# Patient Record
Sex: FEMALE | Race: WHITE | NOT HISPANIC OR LATINO | Employment: OTHER | URBAN - METROPOLITAN AREA
[De-identification: names, ages, dates, MRNs, and addresses within clinical notes are randomized per-mention and may not be internally consistent; named-entity substitution may affect disease eponyms.]

---

## 2021-05-06 ENCOUNTER — TRANSFERRED RECORDS (OUTPATIENT)
Dept: HEALTH INFORMATION MANAGEMENT | Facility: CLINIC | Age: 71
End: 2021-05-06

## 2021-05-12 ENCOUNTER — TRANSFERRED RECORDS (OUTPATIENT)
Dept: HEALTH INFORMATION MANAGEMENT | Facility: CLINIC | Age: 71
End: 2021-05-12

## 2021-05-17 ENCOUNTER — TRANSFERRED RECORDS (OUTPATIENT)
Dept: HEALTH INFORMATION MANAGEMENT | Facility: CLINIC | Age: 71
End: 2021-05-17

## 2021-06-25 ENCOUNTER — TRANSFERRED RECORDS (OUTPATIENT)
Dept: HEALTH INFORMATION MANAGEMENT | Facility: CLINIC | Age: 71
End: 2021-06-25

## 2021-09-10 ENCOUNTER — PATIENT OUTREACH (OUTPATIENT)
Dept: RADIATION ONCOLOGY | Facility: HOSPITAL | Age: 71
End: 2021-09-10

## 2021-09-10 DIAGNOSIS — C20 MALIGNANT NEOPLASM OF RECTUM (H): Primary | ICD-10-CM

## 2021-09-10 PROBLEM — K86.89 PANCREATIC INSUFFICIENCY: Status: ACTIVE | Noted: 2021-09-10

## 2021-09-10 PROBLEM — C34.2 PRIMARY MALIGNANT NEOPLASM OF RIGHT MIDDLE LOBE OF LUNG (H): Status: ACTIVE | Noted: 2021-09-10

## 2021-09-10 PROBLEM — J43.9 EMPHYSEMA LUNG (H): Status: ACTIVE | Noted: 2021-09-10

## 2021-09-10 PROBLEM — I10 HTN (HYPERTENSION): Status: ACTIVE | Noted: 2021-09-10

## 2021-09-10 RX ORDER — LOPERAMIDE HCL 2 MG
2 CAPSULE ORAL 4 TIMES DAILY PRN
COMMUNITY

## 2021-09-10 RX ORDER — AMLODIPINE BESYLATE 5 MG/1
5 TABLET ORAL DAILY
COMMUNITY
End: 2021-09-14

## 2021-09-10 RX ORDER — ACETAMINOPHEN 325 MG/1
325-650 TABLET ORAL EVERY 6 HOURS PRN
COMMUNITY

## 2021-09-10 NOTE — PROGRESS NOTES
PRE VISIT MEDICAL ONCOLOGY     REASON FOR APPOINTMENT    Type of Cancer -   T3N0       SYMPTOMS   Symptom onset - Was having multiple loose stools a day, at times bloody. 45 lb wt loss    Medical Provider Seen Initially - PCP    PROVIDERS  Referring MD - Dr. Brothers  PCP - Beverley Laurent  Surgeon - Dr. Haines  Other provider(s) seen for consultation or treatment -      TREATMENT TO-DATE FOR THIS CANCER  Biopsy - 5-17-21 CT guided biopsy  Invasive moderately differentiated adenocarcinoma of rectum  Surgery -       Chemotherapy - Neoadjuvant FOLFOX    Oncologist - Dr. Brothers  Hormonal therapy used - NA  Other treatments for this Cancer (including over-the-counter) - None    PRIOR HISTORY OF CANCER  None    RECENT IMAGING STUDIES    (list setting/date)    PET - 6-25-21  CT - (CAP)-Chest- 5-10-21 Abdomen/pelvis 5-5-21  Bone Scan (Dexa) -   MRI - Brain 6-19-21  PFTs         CENTRAL LINE/PORT     Yes - PIC / PORT - (End of June        HEALTH SCREENING (list most recent dates)  Mammogram -  Colonoscopy -  Dental Exam - not assessed  T-Dap - 12-7-9  Pneumonia -   Flu Shot -   Shingles - 11-8-16  COVID 3-11-21, 4-1-21    FAMILY CANCER HISTORY (list relative/type of cancer)      No history            TOBACCO USE HISTORY     Former smoker, 50 pack years, quit in January of 2021  OTHER PERTINENT CANCER INFORMATION NOT IDENTIFIED ELSEWHERE  Right middle lobe mass found on CT chest, biopsied poorly differentiated squamous cell carcinoma PD-L1 10 %  History of whipple procedure 5-31-18  Emphysema  Weak and can only walk 30 feet. Requesting a w/c  Reports appetite is coming back  Next chemotherapy is due 9-20-21  Would like appointment around 10:30  Right handed  No history of radiation therapy  Was a

## 2021-09-14 ENCOUNTER — ONCOLOGY VISIT (OUTPATIENT)
Dept: RADIATION ONCOLOGY | Facility: HOSPITAL | Age: 71
End: 2021-09-14
Attending: RADIOLOGY
Payer: MEDICARE

## 2021-09-14 VITALS — WEIGHT: 107.5 LBS | HEIGHT: 66 IN | BODY MASS INDEX: 17.28 KG/M2

## 2021-09-14 DIAGNOSIS — C20 MALIGNANT NEOPLASM OF RECTUM (H): Primary | ICD-10-CM

## 2021-09-14 PROCEDURE — 99205 OFFICE O/P NEW HI 60 MIN: CPT | Performed by: RADIOLOGY

## 2021-09-14 PROCEDURE — G0463 HOSPITAL OUTPT CLINIC VISIT: HCPCS | Performed by: RADIOLOGY

## 2021-09-14 PROCEDURE — 99417 PROLNG OP E/M EACH 15 MIN: CPT | Performed by: RADIOLOGY

## 2021-09-14 ASSESSMENT — PAIN SCALES - GENERAL: PAINLEVEL: NO PAIN (0)

## 2021-09-14 ASSESSMENT — MIFFLIN-ST. JEOR: SCORE: 1026.43

## 2021-09-14 NOTE — PROGRESS NOTES
"  [facility]  DEPARTMENT OF RADIATION ONCOLOGY  CONSULTATION NOTE    Name: Yvette Singletary MRN: [id number]   : 1950 (71 year old)  Date of Service: Sep 14, 2021 Referring: Dr. Brothers, ABRAM       Diagnosis and Cancer Staging  Malignant neoplasm of rectum (H)  Staging form: Colon and Rectum, AJCC 8th Edition  - Clinical stage from 2021: Stage IIA (cT3, cN0, cM0) - Signed by Familia Crews MD on 2021    Primary malignant neoplasm of right middle lobe of lung (H)  Staging form: Lung, AJCC 8th Edition  - Clinical stage from 2021: Stage IA3 (cT1c, cN0, cM0) - Signed by Familia Crews MD on 10/19/2021      History of Present Illness (CE)   The patient is a very pleasant 71-year-old woman who medical oncology referred for consultation about the role of radiation therapy and chemotherapy for the synchronous diagnoses of locoregionally-advanced rectal adenocarcinoma and early stage carcinoma of the right middle lung lobe. Among the patient's pertinent comorbidities potentially affecting toxicity of radiation therapy are bilateral hip replacements, emphysema (FEV1 0.9, DLCO 30%, recent cessation of cigarettes), pancreatic insufficiency (Whipple procedure for nonmalignant disease), irregular bowel movements (diarrhea, cramping, 45 pound weight loss), and esophageal stricture (multiple dilations). The patient's oncologic history across multiple institutions is abstracted as follows. 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 The patient required no adjuvant therapy for oncologic disease but experienced numerous adhesions and strictures resulting in weight loss, cramping, and irregular bowel movements with frequent diarrhea. Unintentional weight loss of approximately 35 pounds since stopping cigarette smoking in 2021 (50 pack years) was " attributed to the abnormal bowel function. However the patient developed blood in stools, change in stool caliber, and change in bowel habits. A surveillance CT of the of the abdomen and pelvis on 5/5/2021 revealed a 2-cm pulmonary mass in this longtime cigarette smoker (50 pack years) as well as abnormal pelvic findings but no clinically suspicious hepatic findings. A chest CT on 5/10/2021 redemonstrated the right lung mass as well as extensive emphysematous changes but no suspicious regional adenopathy. A CT-guided biopsy on 5/17/2021 yielded poorly differentiated squamous cell carcinoma (TTF-1 negative, PD-L1 positive). Due to the ongoing bowel complaints and weight loss, a colonoscopy was performed on 6/4/2021. The procedure identified a large, partially obstructing invasive moderately differentiated adenocarcinoma with high-grade dysplasia within the rectum. A brain MRI on 6/15/2021 was not suspicious for metastatic disease. And endoscopic ultrasound on 6/16/2021 identified T3N0 disease. Pulmonary function testing on 6/23/2021 revealed very poor pulmonary function including an FEV1 of 0.9 and a DLCO that measured 30% of predicted. A PET-CT on 6/25/2021 redemonstrated the 2.2-cm right middle lobe mass, massive rectal disease, and no intrathoracic regional gita or extrathoracic distant metastatic disease. Neoadjuvant FOLFOX chemotherapy (6 cycles) was started on 7/12/2021 and completed on 9/20/2021. The patient continues to have irregular bowel movements including multiple loose, bloody stools per day. Medical oncology recommended preoperative concurrent chemoradiation with 5-FU-based therapy as well as stereotactic body radiotherapy (SBRT) for medically inoperable lung cancer. The patient has dyspnea when clearing her close up a flight of stairs. She denies a history of Crohn's disease or ulcerative colitis. She denies history of radiation exposure or collagen vascular disease.  6/25/2021 PET-CT      Past  Medical History:   Diagnosis Date     Cancer (H)      COPD (chronic obstructive pulmonary disease) (H)      History of blood transfusion      Past Surgical History:   Procedure Laterality Date     hip replacements Bilateral      Outpatient Encounter Medications as of 2021   Medication Sig Dispense Refill     amylase-lipase-protease (CREON 36) [id number] units CPEP Take 36,000 Units by mouth 3 times daily (with meals)       loperamide (IMODIUM) 2 MG capsule Take 2 mg by mouth 4 times daily as needed       umeclidinium-vilanterol (ANORO ELLIPTA) 62.5-25 MCG/INH oral inhaler Inhale 1 puff into the lungs daily       acetaminophen (TYLENOL) 325 MG tablet Take 325-650 mg by mouth every 6 hours as needed (Patient not taking: Reported on 2021)       [DISCONTINUED] amLODIPine (NORVASC) 5 MG tablet Take 5 mg by mouth daily       No facility-administered encounter medications on file as of 2021.     Allergies/Reactions: Fentanyl and related    Orders Placed This Encounter   Procedures     Physical Therapy Referral     Social History     Social History Narrative     Not on file     Socioeconomic History     Marital status: Single     Social History     Tobacco Use     Smoking status: Former Smoker     Types: Cigarettes     Quit date: 2021     Years since quittin.9     Smokeless tobacco: Never Used   Substance Use Topics     Alcohol use: Yes     Comment: 8 drinks a week/ gin     Drug use: Not Currently     Types: Marijuana     Family History   Problem Relation Age of Onset     Cerebrovascular Disease Father    No other cancers in first or second degree relatives.    Baseline Review of Systems (prior to radiation therapy; supplemental to the History)   ROS (score of 0 indicates that symptom is at baseline for most sections below): See Flowsheet for additional comments as indicated.  No Pain (0)                                 Physical Exam   KPS: 70 (cares for self but unable to carry on normal  "activity or do active work).  ECOG Status: 2 (Ambulatory and capable of all selfcare but unable to carry out any work activities; may need help with IADLs up and about > 50% of waking hours)  Vitals: Ht 1.664 m (5' 5.5\")   Wt 48.8 kg (107 lb 8 oz)   BMI 17.62 kg/m    Wt Readings from Last 3 Encounters:   10/27/21 52.3 kg (115 lb 6.4 oz)   10/21/21 53.1 kg (117 lb 1.6 oz)   10/19/21 53.2 kg (117 lb 3.2 oz)     Clinical Examination:  General: Appears clinically/medically stable. Appears in fair general health. Appears excessively thin but not gaunt or wasted Appears moderately fatigued. Appears stated age. Appears well-developed, well-nourished, and in no acute distress. Does not appear acutely or chronically ill. Appears well groomed.  Head: No alopecia. Normocephalic.  Eyes: Anicteric, vision intact.  ENT: Good volume. No hoarseness.  Neck: Soft, supple, symmetric, trachea midline.  Lymphatics: No cervical, supraclavicular, periumbilical, or inguinal adenopathy.  Chest/Breasts: Left chest wall port site is clean, dry, and intact. No erythema, discharge, or cellulitis. No implanted cardiac device.  Lungs: Easy respirations, no use of accessory muscles. Clear to auscultation  Cardiovascular: No jugulovenous distension. No carotid pulsations. RRR, S1, S2.  Abdomen: Nondistended, nontender. Soft. Bowel sounds positive.  Pelvis: Nondistended, nontender. Soft. Bowel sounds positive.  MSK: Shoulder range of motion is good. No arm edema or hand edema. Good muscle tone. No tenderness on palpation.  Integument: No jaundice or pallor.   Neurologic: Right-handed. Alert, oriented, fluent. Memory intact. Motor intact. Sensory intact. No ataxia.  Psychologic: Well-spoken about her cancer and therapy. Good dynamic with her family. Motivated. Pleasant, cooperative, insightful, concrete, and good judgment.    Physician Time on Day of Encounter, and MDM Data Reviewed and Analyzed on Day of Encounter   Time spent on patient " activities is based on Chart review 48 minutes, Visit 45 minutes, and Documentation 24 minutes. Total time: 117 minutes.    MDM based on:       High risk element:  o Locoregionally-advanced rectal cancer. Rectal dysfunction. Right lung cancer. Prior chemotherapy. Pending chemoradiation. Pending pelvic radiation therapy.       Tests, documents, or independent historian(s) analyses include but are not limited to:  o Those referenced above in the HPI.       Independent Interpretations of tests include but are not limited to:  o Those referenced above in the HPI.       Discussion with the medical team about management or test interpretation include but are not limited to:  o Reviewed with medical oncology arrangements for coordination of care.    Physician Information Review   I reviewed the case with the patient and her family, evaluated her, and discussed her treatment options and risks of therapy. I reviewed the medical records, radiographic information, and pathologic studies. I reviewed the imaging studies via PACS and with the patient and her family. I reviewed our intake sheets. The patient and her family are good historians, began the visit with good insights into the disease process, and acknowledged the potentially poor consequences of her disease. They educated themselves through a variety of educational media including the Internet.They demonstrated good comprehension of our discussion and explored the treatment options with good understanding. They asked pertinent questions and insightful follow-up questions. I illustrated the anatomy and field of treatment. We discussed the onsite and telemedicine coverage of our clinic. I offered to speak with her family members and friends today by speakerphone. The patient declined my offer but granted me permission to speak with them if they contact me or come to clinic. They declined referral for an additional opinion or conservative care. They accepted referral to  our social work staff for additional resources including potential counseling. She granted me permission to exchange medical information and records with healthcare professionals. No therapeutic radiation protocols for the patient's disease are available at our Center. We reviewed educational materials including Internet resources and provided the patient with written materials.    We discussed the patient's diagnosis of a synchronous locoregionally-advanced rectal cancer and an early stage, histologically distinct right lung cancer. We discussed the relevant regional anatomy, stage designations, and risk groups for each cancer. We reviewed the patterns of failure after chemoradiation for rectal cancer and SBRT for lung cancer. We reviewed her poor prognosis from her rectal cancer and relatively good prognosis from her lung cancer. We discussed factors specific to her disease including her personal circumstances, comorbidities, and other factors potentially impacting the risk of toxicity and clinical outcomes. We reviewed the concept of multimodality care and the relative contribution of each to the paradigm of treating rectal cancer and lung cancer. We discussed the use of conventional pelvic radiation therapy as a preoperative adjunct to surgery and neoadjuvant chemotherapy. We discussed the use of stereotactic body radiation therapy (SBRT) as the principal therapeutic modality for lung cancer and omission of surgery for upfront local therapy. We discussed limitations of radiographic imaging in identifying the extent of disease. We discussed the potentially beneficial role of radiation therapy in the context of evolving standards and national guidelines of oncologic care such as the NCCN, ACR, and CARYN as well as management during the COVID-19 pandemic for rectal cancer and for lung cancer.    We discussed the concept of non-stereotactic versus stereotactic radiation therapy to the body. We discussed the nature  of external beam radiation therapy from a Row44 TrueBeam linac, concept of CT-based simulation, role of computerized treatment planning, and potential interventions to reduce the toxicity of radiation while improving the efficacy of treatment for rectal cancer and then lung cancer. For both cancers, we would likely use image-guidance of either 3D or VMAT beam arrangements. Together, these techniques permit good coverage of complex target tissues (primary cancer, visible nodes) while maintaining adequate sparing of normal critical structures. For motion management for lung SBRT, we would use 4D-CT simulation and perhaps deep-inspiration breath hold (DIBH) for incorporation of respiratory motion control to assess the impact of pulmonary and cardiac movement on radiation therapy. I would not use protons, TomoTherapy, brachytherapy, or radioprotectant agents. I do not feel that these methods offer a clear benefit for this patient.    We discussed the relative risks, benefits, rationale, potential side effects, alternatives, and adjuncts to radiation therapy for rectal cancer and for lung cancer. Toxicities can be acute or chronic, and can negatively impact long-term quality of life. They can require high levels of supportive care and breaks in radiation therapy. I anticipate at least a moderately severe degree of acute pelvic toxicity during treatment of the rectal cancer. Among the factors will increase the risk of toxicity are the co-morbidities and circumstances notes above. I anticipate a relatively low risk of acute toxicity during SBRT for the lung cancer but an elevated long-term risk of severe pulmonary morbidity due to the patient's poor pulmonary function. The toxicities include but are not limited to the gastrointestinal tract (nausea, vomiting, cramping, diarrhea, mucositis, impaired nutrition, perforation, fistula, adhesions, etc.), urinary tract (dysuria, hematuria, frequency, obstruction, stenosis,  urethritis, cystitis, incontinence, etc.), lymphatic system (local and regional lymphedema), hematopoietic system (anemia, thrombocytopenia, neutropenia, etc.), spinal cord (myelitis, weakness, paralysis, sensory loss, pain, bowel or bladder incontinence, etc.), future wound (infection, breakdown, necrosis, etc.), soft tissue necrosis, bone necrosis,  pain, infection, and other organs as well as systemic toxicities (e.g., fatigue) and long-term risks such as second malignancy. For the lung, additional toxicity include but are not limited to lung (dyspnea, oxygen dependence, cough, pneumonitis, pleuritis, pleural effusion, etc.), primary airways (bronchial necrosis, fistula, obstruction, etc.), great vessels (catastrophic hemorrhage, aneurysm, obstruction, etc.), heart (infarction, pericarditis, pericardial effusion, etc.), esophagus (pain, odynophagia, dysmotility, perforation, fistula, etc.), chest wall (soft tissue and bone necrosis, etc.), and skin (poor cosmesis, erythema, hyperpigmentation, desquamation, breakdown, fibrosis, pruritus, etc.).    Physician Radiation Therapy Assessment/Plan   In summary, I concur with the recommendation of concurrent 5-FU-based chemoradiation to the pelvis for this patient's massive locally advanced rectal adenocarcinoma following neoadjuvant FOLFOX chemotherapy. The patient would then be referred back to colorectal surgery for consideration of a possible sphincter-sparing procedure versus abdominoperineal resection with a permanent colostomy. During the interval between the end of chemoradiation and colorectal surgery, SBRT would be used as potentially curative treatment for the squamous cell carcinoma of the right middle lobe (medically inoperable due to poor pulmonary function and comorbidities). I explained to the patient and her family that pelvic chemoradiation is likely to be highly morbid due to the massive volume of disease, prior aggressive chemotherapy, concurrently  delivery of chemotherapy with radiation therapy, known bowel adhesions from her prior pelvic surgery, pre-existing anorectal sphincter dysfunction, weight loss, poor general health, and numerous other factors. The subsequent lung SBRT carries a significantly elevated risk of toxicity due to the patient's very poor pulmonary function and clinically poor pulmonary reserve. The patient and her family nevertheless wish to proceed as recommended by the extended multidisciplinary oncology team. We will contact the patient to schedule simulation for treatment of her rectal cancer. Upon completion of chemoradiation for rectal adenocarcinoma, the patient will be reevaluated for SBRT to the right lung squamous cell carcinoma. We answered all questions to their satisfaction. Thank for allowing us to participate in the care of this very pleasant patient.      Familia Crews MD, PhD  Department of Radiation Oncology  Tel: [phone]  Fax: [phone]    Care Team:  Viraj Brothers III, M.D. (medical oncology)  Marina Haines M.D. (colorectal surgery)  Beverley Laurent M.D. (medicine)

## 2021-09-20 ENCOUNTER — ALLIED HEALTH/NURSE VISIT (OUTPATIENT)
Dept: RADIATION ONCOLOGY | Facility: HOSPITAL | Age: 71
End: 2021-09-20
Attending: RADIOLOGY
Payer: MEDICARE

## 2021-09-20 DIAGNOSIS — C20 MALIGNANT NEOPLASM OF RECTUM (H): Primary | ICD-10-CM

## 2021-09-20 PROCEDURE — 77334 RADIATION TREATMENT AID(S): CPT | Performed by: RADIOLOGY

## 2021-09-20 PROCEDURE — 77470 SPECIAL RADIATION TREATMENT: CPT | Mod: 26 | Performed by: RADIOLOGY

## 2021-09-20 PROCEDURE — 77263 THER RADIOLOGY TX PLNG CPLX: CPT | Performed by: RADIOLOGY

## 2021-09-20 PROCEDURE — 77334 RADIATION TREATMENT AID(S): CPT | Mod: 26 | Performed by: RADIOLOGY

## 2021-09-20 NOTE — PROGRESS NOTES
[facility]  DEPARTMENT OF RADIATION ONCOLOGY  SIMULATION NOTE    Name: Yvette Singletary MRN: [id number]   : 1950 (70 year old)  Date of Service: 2021 Referring: Dr. Brothers       Diagnosis and Cancer Staging  Rectal Cancer (gM2T5P8/IIA)      Procedure   The patient comes to clinic with her twin sons for simulation and radiation therapy for rectal cancer (curative intent). Treatment of a stage I lung is deferred until at least completion of chemoradiation.  Although very fatigued and worn, she felt relatively well and offered no new complaints. We counseled the patient about the potential impact of COVID-19 on her treatment. She denied known exposure to COVID-19, risky behavior, or related symptoms including febrile, chest, gustatory, gastrointestinal, and systemic complaints.    With the patient, we verified her identification, consent, site, and side of treatment. KPS: 70. On examination, she appeared stable and in no acute distress. Chemo-infusion pack running via port. ENT had normal lip color and no hoarseness. Lungs had no cough, easy respirations that were regular, unlabored, and no use of accessory musculature. Abdomen and pelvis was soft and non-distended. Neuro was alert, oriented, nonfocal. Psych was pleasant, cooperative, insightful, concrete.     We placed the patient in a modified supine position. We used orthogonal lasers to align her with the CT simulator. We immobilized the patient with a customized vacuum bag to improve the reproducibility and safety for daily radiation therapy. We placed radiopaque markers to assist in identifying topographical landmarks for simulation. We obtained  and axial CT imaging through the abdomen and pelvis. Due to excessive flatus, the patient was taken off the table, permitted to pass flatus, and re-scanned. Therapy, treatment planning, and I used virtual simulation techniques to verify the adequacy of the CT images and to create a  preliminary isocenter for setup of treatment. We placed  tattoos to casa that isocenter on the patient's torso. The patient tolerated the procedure well and without complications.    We again contacted [facility] to arrange referral for Physical Therapy/Occupational Therapy and again faxed the referreal information. We will use diagnostic (e.g., PET-CT for image fusion) and radiation therapy imaging studies for CT-based treatment planning. I anticipate utilizing a form of intensity-modulated radiation therapy, perhaps volumetric modulated radiation therapy (VMAT), to develop a computerized treatment plan whose dosimetric analysis (e.g., dose-volume histogram (DVH)) indicates adequate coverage of target tissues and sparing of nearby normal structures (e.g., genitourinary structures, small bowel, large bowel, right femoral head, and left femoral head). I anticipate the possible use of a boost/cone down plan vs. simultaneous integrated boost (SIB). We will complete routine QA procedures. We will coordinate care with medical oncology for the delivery of concurrent chemoradiation. The patient wished to proceed as recommended. We answered all questions to her satisfaction.    Special Treatment Procedure   Special Treatment Procedure is based on:  o Delivery of radiation therapy will require additional time, effort, and resources due to administration of cytotoxic chemotherapy during radiation therapy (concurrent chemoradiation) plus supportive care and labs.      Familia Crews MD  Department of Radiation Oncology  Tel: [phone]  Fax: [phone]

## 2021-09-21 ENCOUNTER — TELEPHONE (OUTPATIENT)
Dept: RADIATION ONCOLOGY | Facility: HOSPITAL | Age: 71
End: 2021-09-21

## 2021-09-21 DIAGNOSIS — C20 MALIGNANT NEOPLASM OF RECTUM (H): Primary | ICD-10-CM

## 2021-09-21 NOTE — TELEPHONE ENCOUNTER
Clinic Care Coordination Contact  Care Team Conversations    Patient will need a CT for abdomen and pelvis with metal artifact reduction for treatment planning.

## 2021-09-27 ENCOUNTER — HOSPITAL ENCOUNTER (OUTPATIENT)
Dept: CT IMAGING | Facility: HOSPITAL | Age: 71
Discharge: HOME | End: 2021-09-27
Attending: RADIOLOGY | Admitting: RADIOLOGY
Payer: MEDICARE

## 2021-09-27 DIAGNOSIS — C20 MALIGNANT NEOPLASM OF RECTUM (H): ICD-10-CM

## 2021-09-27 PROCEDURE — 74176 CT ABD & PELVIS W/O CONTRAST: CPT | Mod: MG

## 2021-09-29 ENCOUNTER — TRANSFERRED RECORDS (OUTPATIENT)
Dept: HEALTH INFORMATION MANAGEMENT | Facility: CLINIC | Age: 71
End: 2021-09-29

## 2021-09-29 PROCEDURE — 77300 RADIATION THERAPY DOSE PLAN: CPT | Performed by: RADIOLOGY

## 2021-09-29 PROCEDURE — 77301 RADIOTHERAPY DOSE PLAN IMRT: CPT | Mod: 26 | Performed by: RADIOLOGY

## 2021-09-29 PROCEDURE — 77338 DESIGN MLC DEVICE FOR IMRT: CPT | Mod: 26 | Performed by: RADIOLOGY

## 2021-09-29 PROCEDURE — 77301 RADIOTHERAPY DOSE PLAN IMRT: CPT | Performed by: RADIOLOGY

## 2021-09-29 PROCEDURE — 77300 RADIATION THERAPY DOSE PLAN: CPT | Mod: 26 | Performed by: RADIOLOGY

## 2021-09-29 PROCEDURE — 77338 DESIGN MLC DEVICE FOR IMRT: CPT | Performed by: RADIOLOGY

## 2021-10-04 ENCOUNTER — RESULTS ONLY (OUTPATIENT)
Dept: RADIATION ONCOLOGY | Facility: HOSPITAL | Age: 71
End: 2021-10-04

## 2021-10-04 ENCOUNTER — APPOINTMENT (OUTPATIENT)
Dept: RADIATION ONCOLOGY | Facility: HOSPITAL | Age: 71
End: 2021-10-04
Attending: RADIOLOGY
Payer: MEDICARE

## 2021-10-04 LAB
RAD ONC ARIA COURSE ID: NORMAL
RAD ONC ARIA COURSE LAST TREATMENT DATE: NORMAL
RAD ONC ARIA COURSE START DATE: NORMAL
RAD ONC ARIA COURSE TREATMENT ELAPSED DAYS: 0
RAD ONC ARIA FIRST TREATMENT DATE: NORMAL
RAD ONC ARIA PLAN FRACTIONS TREATED TO DATE: 1
RAD ONC ARIA PLAN ID: NORMAL
RAD ONC ARIA PLAN NAME: NORMAL
RAD ONC ARIA PLAN PRESCRIBED DOSE PER FRACTION: 1.8 GY
RAD ONC ARIA PLAN TOTAL FRACTIONS PRESCRIBED: 25
RAD ONC ARIA PLAN TOTAL PRESCRIBED DOSE: 4500 CGY
RAD ONC ARIA REFERENCE POINT DOSAGE GIVEN TO DATE: NORMAL GY
RAD ONC ARIA REFERENCE POINT ID: NORMAL

## 2021-10-04 PROCEDURE — 77386 HC IMRT TREATMENT DELIVERY, COMPLEX: CPT | Performed by: RADIOLOGY

## 2021-10-04 PROCEDURE — 77014 PR CT GUIDE FOR PLACEMENT RADIATION THERAPY FIELDS: CPT | Mod: 26 | Performed by: RADIOLOGY

## 2021-10-05 ENCOUNTER — APPOINTMENT (OUTPATIENT)
Dept: RADIATION ONCOLOGY | Facility: HOSPITAL | Age: 71
End: 2021-10-05
Payer: MEDICARE

## 2021-10-05 ENCOUNTER — RESULTS ONLY (OUTPATIENT)
Dept: RADIATION ONCOLOGY | Facility: HOSPITAL | Age: 71
End: 2021-10-05

## 2021-10-05 LAB
RAD ONC ARIA COURSE ID: NORMAL
RAD ONC ARIA COURSE LAST TREATMENT DATE: NORMAL
RAD ONC ARIA COURSE START DATE: NORMAL
RAD ONC ARIA COURSE TREATMENT ELAPSED DAYS: 1
RAD ONC ARIA FIRST TREATMENT DATE: NORMAL
RAD ONC ARIA PLAN FRACTIONS TREATED TO DATE: 2
RAD ONC ARIA PLAN ID: NORMAL
RAD ONC ARIA PLAN NAME: NORMAL
RAD ONC ARIA PLAN PRESCRIBED DOSE PER FRACTION: 1.8 GY
RAD ONC ARIA PLAN TOTAL FRACTIONS PRESCRIBED: 25
RAD ONC ARIA PLAN TOTAL PRESCRIBED DOSE: 4500 CGY
RAD ONC ARIA REFERENCE POINT DOSAGE GIVEN TO DATE: NORMAL GY
RAD ONC ARIA REFERENCE POINT ID: NORMAL

## 2021-10-05 PROCEDURE — 77386 HC IMRT TREATMENT DELIVERY, COMPLEX: CPT | Performed by: RADIOLOGY

## 2021-10-05 PROCEDURE — 77014 PR CT GUIDE FOR PLACEMENT RADIATION THERAPY FIELDS: CPT | Mod: 26 | Performed by: RADIOLOGY

## 2021-10-06 ENCOUNTER — RESULTS ONLY (OUTPATIENT)
Dept: RADIATION ONCOLOGY | Facility: HOSPITAL | Age: 71
End: 2021-10-06

## 2021-10-06 ENCOUNTER — OFFICE VISIT (OUTPATIENT)
Dept: RADIATION ONCOLOGY | Facility: HOSPITAL | Age: 71
End: 2021-10-06
Attending: RADIOLOGY
Payer: MEDICARE

## 2021-10-06 VITALS — WEIGHT: 114.8 LBS | BODY MASS INDEX: 21.67 KG/M2 | HEIGHT: 61 IN

## 2021-10-06 DIAGNOSIS — C20 MALIGNANT NEOPLASM OF RECTUM (H): Primary | ICD-10-CM

## 2021-10-06 LAB
RAD ONC ARIA COURSE ID: NORMAL
RAD ONC ARIA COURSE LAST TREATMENT DATE: NORMAL
RAD ONC ARIA COURSE START DATE: NORMAL
RAD ONC ARIA COURSE TREATMENT ELAPSED DAYS: 2
RAD ONC ARIA FIRST TREATMENT DATE: NORMAL
RAD ONC ARIA PLAN FRACTIONS TREATED TO DATE: 3
RAD ONC ARIA PLAN ID: NORMAL
RAD ONC ARIA PLAN NAME: NORMAL
RAD ONC ARIA PLAN PRESCRIBED DOSE PER FRACTION: 1.8 GY
RAD ONC ARIA PLAN TOTAL FRACTIONS PRESCRIBED: 25
RAD ONC ARIA PLAN TOTAL PRESCRIBED DOSE: 4500 CGY
RAD ONC ARIA REFERENCE POINT DOSAGE GIVEN TO DATE: NORMAL GY
RAD ONC ARIA REFERENCE POINT ID: NORMAL

## 2021-10-06 PROCEDURE — 77386 HC IMRT TREATMENT DELIVERY, COMPLEX: CPT | Performed by: RADIOLOGY

## 2021-10-06 RX ORDER — PROCHLORPERAZINE MALEATE 10 MG
10 TABLET ORAL PRN
COMMUNITY
Start: 2021-06-14

## 2021-10-06 ASSESSMENT — MIFFLIN-ST. JEOR: SCORE: 980.96

## 2021-10-06 ASSESSMENT — PAIN SCALES - GENERAL: PAINLEVEL: NO PAIN (0)

## 2021-10-06 NOTE — PROGRESS NOTES
"  [facility]  DEPARTMENT OF RADIATION ONCOLOGY   ON TREATMENT VISIT (OTV) NOTE    Name: Yvette Singletary MRN: [id number]   : 1950 (70 year old)  Date of Service: Oct 6, 2021 Referring: Dr. Brothers     Diagnosis and Cancer Staging  Malignant neoplasm of rectum (H)  Staging form: Colon and Rectum, AJCC 8th Edition  - Clinical stage from 2021: Stage IIA (cT3, cN0, cM0) - Signed by Familia Crews MD on 2021      Radiation Therapy   Site: Pelvis.   Fraction(s) = Dose (cGy)    Received 3 = 540 (at 180 each)    Remaining 22 + 3 fractions    Goal 28 (25 + 3) = 5040 (4500 + 540)     Site: Right middle lobe (stereotactic body radiotherapy (SBRT)).   Fraction(s) = Dose (cGy)    Received Pending after pelvic XRT    Remaining Pending    Goal Pending   (formatted for [employer])    Concurrent chemoradiation week 0 (fractions 1-5): Grade 0 toxicity due to radiation therapy (CTCAE 5.0).     Recent History (E)   Receiving concurrent chemoradiation with weekly infusional 5-FU (C1D1 10/4/2021). Arrived alone to examination area. Feels that she has no toxicity from radiation therapy. States that she now feels \"normal\". Reports that the neoadjuvant chemotherapy greatly improved her wellbeing and rectal function. She feels that her energy is very good (reading, aggressively participating in home physical therapy, proudly speaks about exercising). Reports had bowel movements are greatly improved due to chemotherapy (at presentation, had diarrhea \"constantly\" which the patient equated to \"50 times per day\" with watery leakage and gassy bloating pain). Currently, the patient typically has 2 bowel movements in the morning and sometimes has a third stool later in the day. The bowel movements are formed and soft. Denies blood, dyschezia, pad use, or adult disposable undergarments. Denies rectal or anal pain. Urinates about 10 times during the day and wakes 3 times a night due to the need to urinate. Denies " hematuria. Has not used Compazine since starting radiation therapy. Denies nausea or vomiting. Denies cough, dyspnea, or hemoptysis. Denies pain of the upper outer right quadrant of the abdomen.    Past Medical History:   Diagnosis Date     Cancer (H)      COPD (chronic obstructive pulmonary disease) (H)      History of blood transfusion      Past Surgical History:   Procedure Laterality Date     hip replacements Bilateral      Outpatient Medications Prior to Visit   Medication Sig Dispense Refill     omeprazole (PRILOSEC) 20 MG DR capsule Take 20 mg by mouth daily       prochlorperazine (COMPAZINE) 10 MG tablet Take 10 mg by mouth as needed       acetaminophen (TYLENOL) 325 MG tablet Take 325-650 mg by mouth every 6 hours as needed (Patient not taking: Reported on 2021)       amylase-lipase-protease (CREON 36) [id number] units CPEP Take 36,000 Units by mouth 3 times daily (with meals)       loperamide (IMODIUM) 2 MG capsule Take 2 mg by mouth 4 times daily as needed       umeclidinium-vilanterol (ANORO ELLIPTA) 62.5-25 MCG/INH oral inhaler Inhale 1 puff into the lungs daily       No facility-administered medications prior to visit.     Allergies/Reactions: Fentanyl and related    No orders of the defined types were placed in this encounter.    Social History     Tobacco Use     Smoking status: Former Smoker     Types: Cigarettes     Quit date: 2021     Years since quittin.7     Smokeless tobacco: Never Used   Substance Use Topics     Alcohol use: Yes     Comment: 8 drinks a week/ gin     Drug use: Not Currently     Types: Marijuana     Family History   Problem Relation Age of Onset     Cerebrovascular Disease Father    No cancers in first or second degree relatives.    ROS (score of 0 indicates that symptom is at baseline for most sections below)   See Flowsheet for additional comments as indicated.  No Pain (0)                               Physical Examination   Vitals: Ht 1.538 m (5'  "0.55\")   Wt 52.1 kg (114 lb 12.8 oz)   BMI 22.01 kg/m    Wt Readings from Last 3 Encounters:   10/06/21 52.1 kg (114 lb 12.8 oz)   09/14/21 48.8 kg (107 lb 8 oz)     Evaluation (during the week, additional physician evaluations at linac with the staff)):  KPS: 80 (effort for normal activity; some signs or symptoms of disease).  General: Appears clinically stable. Appears in fair general health. Appears thin but not gaunt or wasted. Appears slightly fatigued. Appears stated age. Appears well-developed, well-nourished, and in no acute distress. Does not appear acutely or chronically ill. Appears well groomed.  Head: No alopecia. Normocephalic.  Eyes: Anicteric.  ENT: Good volume. No hoarseness.  Neck: No jugulovenous distension.  Lymphatics: No periumbilical, parotid, periparotid, cervical, or supraclavicular adenopathy.  Chest/Breasts: Left chest wall port site is clean, dry, and intact. No erythema, discharge, or cellulitis. No implanted cardiac device.   Lungs: Easy respirations, no use of accessory muscles. Clear to auscultation.  Cardiovascular: No jugulovenous distension. No carotid pulsations. RRR, S1, S2.  Abdomen: Nondistended, nontender. Soft. Bowel sounds positive.  Pelvis: Nondistended, nontender.  MSK: Shoulder range of motion is . No arm edema or hand edema. No cords or calf tenderness.  Integument: No jaundice or pallor. No cellulitis.  Neurologic: Right-handed. Alert, oriented, fluent. Memory intact. Motor intact. Sensory intact. No ataxia.  Psychologic: Bright, upbeat, relaxed, confident, engaging, and motivated. Pleasant, cooperative, insightful, and concrete.    Physician Radiation Therapy Impression   Routine tolerance to radiation therapy.    Physician Radiation Therapy Plan   No new interventions. Boost/cone down pending. Reviewed graphical 3D plan with attention to dose to the rectum, small bowel, large bowel, bladder, pelvic bones, etc. Reviewed the large volume of stool and gas in the " treatment planning CT, daily cone beam CT, and associated elevated risk of pelvic toxicity from radiation therapy (e.g., nausea, vomiting, cramping, diarrhea, etc) as well as complications after surgery. Reviewed anticipated time course, nature, and potential interventions for managing toxicity during and after radiation therapy. Patient aware of onsite and telemedicine coverage of our clinic. She wished to proceed with treatment. All questions answered to the satisfaction of the patient.    Physician Radiation Therapy Weekly Review   Reviewed available labs and diagnostic studies. Interpreted as adequate to proceed with radiation therapy. Discussed management with Therapy, Treatment Planning, and Nursing. Reviewed weekly routine QA, linac imaging, and clinical set up are adequate to proceed with radiation therapy as prescribed.      Familia Crews M.D., Ph.D.  Department of Radiation Oncology  Tel: [phone]  Fax: [phone]

## 2021-10-07 ENCOUNTER — APPOINTMENT (OUTPATIENT)
Dept: RADIATION ONCOLOGY | Facility: HOSPITAL | Age: 71
End: 2021-10-07
Payer: MEDICARE

## 2021-10-07 ENCOUNTER — RESULTS ONLY (OUTPATIENT)
Dept: RADIATION ONCOLOGY | Facility: HOSPITAL | Age: 71
End: 2021-10-07

## 2021-10-07 ENCOUNTER — DOCUMENTATION ONLY (OUTPATIENT)
Dept: ONCOLOGY | Facility: OTHER | Age: 71
End: 2021-10-07

## 2021-10-07 LAB
RAD ONC ARIA COURSE ID: NORMAL
RAD ONC ARIA COURSE LAST TREATMENT DATE: NORMAL
RAD ONC ARIA COURSE START DATE: NORMAL
RAD ONC ARIA COURSE TREATMENT ELAPSED DAYS: 3
RAD ONC ARIA FIRST TREATMENT DATE: NORMAL
RAD ONC ARIA PLAN FRACTIONS TREATED TO DATE: 4
RAD ONC ARIA PLAN ID: NORMAL
RAD ONC ARIA PLAN NAME: NORMAL
RAD ONC ARIA PLAN PRESCRIBED DOSE PER FRACTION: 1.8 GY
RAD ONC ARIA PLAN TOTAL FRACTIONS PRESCRIBED: 25
RAD ONC ARIA PLAN TOTAL PRESCRIBED DOSE: 4500 CGY
RAD ONC ARIA REFERENCE POINT DOSAGE GIVEN TO DATE: NORMAL GY
RAD ONC ARIA REFERENCE POINT ID: NORMAL

## 2021-10-07 PROCEDURE — 77386 HC IMRT TREATMENT DELIVERY, COMPLEX: CPT | Performed by: RADIOLOGY

## 2021-10-07 PROCEDURE — 77014 PR CT GUIDE FOR PLACEMENT RADIATION THERAPY FIELDS: CPT | Mod: 26 | Performed by: RADIOLOGY

## 2021-10-07 NOTE — PROGRESS NOTES
Radiation Oncology - Clinic Note (OTV)      Evaluated patient at linac and afterward. Today's cone beam CT suggests that the right middle lobe carcinoma has not progressed significantly during the interval since diagnosis. I therefore recommended that we proceed with the current plan of completing pelvic chemoradiation and then proceeding with SBRT to the right middle lobe carcinoma as definitive, potentially curative treatment of that synchronous cancer. The patient wished to proceed as recommended. I answered all questions to her satisfaction.      Familia Crews M.D., Ph.D.  Department of Radiation Oncology  Tel: [phone]  Fax: [phone]

## 2021-10-08 ENCOUNTER — CARE COORDINATION (OUTPATIENT)
Dept: ONCOLOGY | Facility: OTHER | Age: 71
End: 2021-10-08

## 2021-10-08 ENCOUNTER — APPOINTMENT (OUTPATIENT)
Dept: RADIATION ONCOLOGY | Facility: HOSPITAL | Age: 71
End: 2021-10-08
Payer: MEDICARE

## 2021-10-08 ENCOUNTER — RESULTS ONLY (OUTPATIENT)
Dept: RADIATION ONCOLOGY | Facility: HOSPITAL | Age: 71
End: 2021-10-08

## 2021-10-08 LAB
RAD ONC ARIA COURSE ID: NORMAL
RAD ONC ARIA COURSE LAST TREATMENT DATE: NORMAL
RAD ONC ARIA COURSE START DATE: NORMAL
RAD ONC ARIA COURSE TREATMENT ELAPSED DAYS: 4
RAD ONC ARIA FIRST TREATMENT DATE: NORMAL
RAD ONC ARIA PLAN FRACTIONS TREATED TO DATE: 5
RAD ONC ARIA PLAN ID: NORMAL
RAD ONC ARIA PLAN NAME: NORMAL
RAD ONC ARIA PLAN PRESCRIBED DOSE PER FRACTION: 1.8 GY
RAD ONC ARIA PLAN TOTAL FRACTIONS PRESCRIBED: 25
RAD ONC ARIA PLAN TOTAL PRESCRIBED DOSE: 4500 CGY
RAD ONC ARIA REFERENCE POINT DOSAGE GIVEN TO DATE: NORMAL GY
RAD ONC ARIA REFERENCE POINT ID: NORMAL

## 2021-10-08 PROCEDURE — 77014 PR CT GUIDE FOR PLACEMENT RADIATION THERAPY FIELDS: CPT | Mod: 26 | Performed by: RADIOLOGY

## 2021-10-08 PROCEDURE — 77386 HC IMRT TREATMENT DELIVERY, COMPLEX: CPT | Performed by: RADIOLOGY

## 2021-10-08 PROCEDURE — 77427 RADIATION TX MANAGEMENT X5: CPT | Performed by: RADIOLOGY

## 2021-10-08 PROCEDURE — 77336 RADIATION PHYSICS CONSULT: CPT | Performed by: RADIOLOGY

## 2021-10-08 NOTE — PROGRESS NOTES
Care Coordination Assessment    Patient Name: Yvette Singletary           [facility] directive: Does not have one on file and patient stated that she is not interested in any information.  Marital status: .    Living arrangements: Lives at home with her hernández retriever.    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    [employer]: No.    Home care/PCA: No.  /County services: No.    PCP: Beverley Laurent  Pharmacy: [facility]        Med management: Patient claimed that she manages her own medications.  Insurance: Medicare    Ambulation: Stated she would use a wheelchair in certain settings if she felt a little weak, but she has not used one for a while now. Rebuilding strength.        Falls: None.  Equipment: None.    Nutrition: Patient stated that it is getting better than it was.        Sleep: Stated that it is getting better than it was.    Adequate resources for needs (housing, utilities, food/meds): Patient claimed that she has adequate resources for all of her needs at this time.    Issues with household chores/activities of daily living: She claimed that she tires easily but has no issues completing tasks. She has a female friend that will come over sometimes and help with certain things and to have visits.    Transportation: Patient is able to drive herself but gets rides from her family or friends for treatments.    Social activities/hobbies: Patient is an avid reader, likes to embroider, and is rebuilding her strength so she can get back to gardening, swimming, and taking walks next year.  Mental health: Strong and optimistic.  Support System: Strong.  Substance use/abuse, exposure to violence/abuse: No.      NICO comments: NICO met with very pleasant 70 year old woman today. She stated that everything is going well, she hasn't had too many issues with her treatments and she is finally starting to gain back weight and sleep more soundly at night. She stated that  "she has a very strong support system around her and is always in contact with multiple people throughout her day. When asked what \"quality of life\" meant to her, she stated \"it's having and being with your family and friends, getting together, and having that closeness...that is life.\" When SW asked her if she felt she had a good quality of life she claimed \"yes, I am very meche.\"  Patient stated that she felt all of her questions and concerns have been properly addressed by doctors and staff and said they \"are just awesome, all of them. They've made this easy for me.\"  SW gave patient her contact information for any further questions or concerns.    KEILY Caldwell    "

## 2021-10-11 ENCOUNTER — APPOINTMENT (OUTPATIENT)
Dept: RADIATION ONCOLOGY | Facility: HOSPITAL | Age: 71
End: 2021-10-11
Payer: MEDICARE

## 2021-10-11 ENCOUNTER — RESULTS ONLY (OUTPATIENT)
Dept: RADIATION ONCOLOGY | Facility: HOSPITAL | Age: 71
End: 2021-10-11

## 2021-10-11 LAB
RAD ONC ARIA COURSE ID: NORMAL
RAD ONC ARIA COURSE LAST TREATMENT DATE: NORMAL
RAD ONC ARIA COURSE START DATE: NORMAL
RAD ONC ARIA COURSE TREATMENT ELAPSED DAYS: 7
RAD ONC ARIA FIRST TREATMENT DATE: NORMAL
RAD ONC ARIA PLAN FRACTIONS TREATED TO DATE: 6
RAD ONC ARIA PLAN ID: NORMAL
RAD ONC ARIA PLAN NAME: NORMAL
RAD ONC ARIA PLAN PRESCRIBED DOSE PER FRACTION: 1.8 GY
RAD ONC ARIA PLAN TOTAL FRACTIONS PRESCRIBED: 25
RAD ONC ARIA PLAN TOTAL PRESCRIBED DOSE: 4500 CGY
RAD ONC ARIA REFERENCE POINT DOSAGE GIVEN TO DATE: NORMAL GY
RAD ONC ARIA REFERENCE POINT ID: NORMAL

## 2021-10-11 PROCEDURE — 77386 HC IMRT TREATMENT DELIVERY, COMPLEX: CPT | Performed by: RADIOLOGY

## 2021-10-11 PROCEDURE — 77014 PR CT GUIDE FOR PLACEMENT RADIATION THERAPY FIELDS: CPT | Mod: 26 | Performed by: RADIOLOGY

## 2021-10-12 ENCOUNTER — APPOINTMENT (OUTPATIENT)
Dept: RADIATION ONCOLOGY | Facility: HOSPITAL | Age: 71
End: 2021-10-12
Payer: MEDICARE

## 2021-10-12 ENCOUNTER — RESULTS ONLY (OUTPATIENT)
Dept: RADIATION ONCOLOGY | Facility: HOSPITAL | Age: 71
End: 2021-10-12

## 2021-10-12 LAB
RAD ONC ARIA COURSE ID: NORMAL
RAD ONC ARIA COURSE LAST TREATMENT DATE: NORMAL
RAD ONC ARIA COURSE START DATE: NORMAL
RAD ONC ARIA COURSE TREATMENT ELAPSED DAYS: 8
RAD ONC ARIA FIRST TREATMENT DATE: NORMAL
RAD ONC ARIA PLAN FRACTIONS TREATED TO DATE: 7
RAD ONC ARIA PLAN ID: NORMAL
RAD ONC ARIA PLAN NAME: NORMAL
RAD ONC ARIA PLAN PRESCRIBED DOSE PER FRACTION: 1.8 GY
RAD ONC ARIA PLAN TOTAL FRACTIONS PRESCRIBED: 25
RAD ONC ARIA PLAN TOTAL PRESCRIBED DOSE: 4500 CGY
RAD ONC ARIA REFERENCE POINT DOSAGE GIVEN TO DATE: NORMAL GY
RAD ONC ARIA REFERENCE POINT ID: NORMAL

## 2021-10-12 PROCEDURE — 77014 PR CT GUIDE FOR PLACEMENT RADIATION THERAPY FIELDS: CPT | Mod: 26 | Performed by: RADIOLOGY

## 2021-10-12 PROCEDURE — 77386 HC IMRT TREATMENT DELIVERY, COMPLEX: CPT | Performed by: RADIOLOGY

## 2021-10-13 ENCOUNTER — OFFICE VISIT (OUTPATIENT)
Dept: RADIATION ONCOLOGY | Facility: HOSPITAL | Age: 71
End: 2021-10-13
Payer: MEDICARE

## 2021-10-13 ENCOUNTER — RESULTS ONLY (OUTPATIENT)
Dept: RADIATION ONCOLOGY | Facility: HOSPITAL | Age: 71
End: 2021-10-13

## 2021-10-13 ENCOUNTER — APPOINTMENT (OUTPATIENT)
Dept: RADIATION ONCOLOGY | Facility: HOSPITAL | Age: 71
End: 2021-10-13
Payer: MEDICARE

## 2021-10-13 VITALS — WEIGHT: 118 LBS | BODY MASS INDEX: 22.28 KG/M2 | HEIGHT: 61 IN

## 2021-10-13 DIAGNOSIS — C20 MALIGNANT NEOPLASM OF RECTUM (H): Primary | ICD-10-CM

## 2021-10-13 LAB
RAD ONC ARIA COURSE ID: NORMAL
RAD ONC ARIA COURSE LAST TREATMENT DATE: NORMAL
RAD ONC ARIA COURSE START DATE: NORMAL
RAD ONC ARIA COURSE TREATMENT ELAPSED DAYS: 9
RAD ONC ARIA FIRST TREATMENT DATE: NORMAL
RAD ONC ARIA PLAN FRACTIONS TREATED TO DATE: 8
RAD ONC ARIA PLAN ID: NORMAL
RAD ONC ARIA PLAN NAME: NORMAL
RAD ONC ARIA PLAN PRESCRIBED DOSE PER FRACTION: 1.8 GY
RAD ONC ARIA PLAN TOTAL FRACTIONS PRESCRIBED: 25
RAD ONC ARIA PLAN TOTAL PRESCRIBED DOSE: 4500 CGY
RAD ONC ARIA REFERENCE POINT DOSAGE GIVEN TO DATE: NORMAL GY
RAD ONC ARIA REFERENCE POINT ID: NORMAL

## 2021-10-13 PROCEDURE — 77386 HC IMRT TREATMENT DELIVERY, COMPLEX: CPT | Performed by: RADIOLOGY

## 2021-10-13 PROCEDURE — 77014 PR CT GUIDE FOR PLACEMENT RADIATION THERAPY FIELDS: CPT | Mod: 26 | Performed by: RADIOLOGY

## 2021-10-13 ASSESSMENT — PAIN SCALES - GENERAL: PAINLEVEL: NO PAIN (0)

## 2021-10-13 ASSESSMENT — MIFFLIN-ST. JEOR: SCORE: 990.47

## 2021-10-13 NOTE — NURSING NOTE
"Oncology Rooming Note    October 13, 2021 10:49 AM   Yvette Singletary is a 71 year old female who presents for:    No chief complaint on file.    Initial Vitals: Ht 1.538 m (5' 0.55\")   Wt 53.5 kg (118 lb)   BMI 22.63 kg/m   Estimated body mass index is 22.63 kg/m  as calculated from the following:    Height as of this encounter: 1.538 m (5' 0.55\").    Weight as of this encounter: 53.5 kg (118 lb). Body surface area is 1.51 meters squared.  No Pain (0) Comment: Data Unavailable   No LMP recorded.  Allergies reviewed: Yes  Medications reviewed: Yes    Medications: Medication refills not needed today.  Pharmacy name entered into [facility]: [facility] - [address]    Clinical concerns: ROS (score of 0 indicates that symptom is at baseline for most sections below): See Flowsheet for additional comments as indicated.  No Pain (0)  Nutrition Alteration  Diet Type: Patient's Preference  Anorexia NCI: 0 - None  Skin  Skin Reaction: 0 - No changes     ENT and Mouth Exam  Mucositis - Current: 0 - None   Mucositis - Internal Severity: 0 - None   Esophagitis: 0 - None     Gastrointestinal  Diarrhea: 2 - Three to five soft or liquid bowel movements per day  Weight loss:  (0 no wt loss)  Genitourinary  Urinary Status: 0 - Normal  Psychosocial  Mood - Anxiety: 0 - Normal  Mood - Depression: 0 - Normal    Dr. Crews was notified.      Ayah Dobson RN              "

## 2021-10-14 ENCOUNTER — APPOINTMENT (OUTPATIENT)
Dept: RADIATION ONCOLOGY | Facility: HOSPITAL | Age: 71
End: 2021-10-14
Payer: MEDICARE

## 2021-10-14 ENCOUNTER — RESULTS ONLY (OUTPATIENT)
Dept: RADIATION ONCOLOGY | Facility: HOSPITAL | Age: 71
End: 2021-10-14

## 2021-10-14 LAB
RAD ONC ARIA COURSE ID: NORMAL
RAD ONC ARIA COURSE LAST TREATMENT DATE: NORMAL
RAD ONC ARIA COURSE START DATE: NORMAL
RAD ONC ARIA COURSE TREATMENT ELAPSED DAYS: 10
RAD ONC ARIA FIRST TREATMENT DATE: NORMAL
RAD ONC ARIA PLAN FRACTIONS TREATED TO DATE: 9
RAD ONC ARIA PLAN ID: NORMAL
RAD ONC ARIA PLAN NAME: NORMAL
RAD ONC ARIA PLAN PRESCRIBED DOSE PER FRACTION: 1.8 GY
RAD ONC ARIA PLAN TOTAL FRACTIONS PRESCRIBED: 25
RAD ONC ARIA PLAN TOTAL PRESCRIBED DOSE: 4500 CGY
RAD ONC ARIA REFERENCE POINT DOSAGE GIVEN TO DATE: NORMAL GY
RAD ONC ARIA REFERENCE POINT ID: NORMAL

## 2021-10-14 PROCEDURE — 77014 PR CT GUIDE FOR PLACEMENT RADIATION THERAPY FIELDS: CPT | Mod: 26 | Performed by: RADIOLOGY

## 2021-10-14 PROCEDURE — 77386 HC IMRT TREATMENT DELIVERY, COMPLEX: CPT | Performed by: RADIOLOGY

## 2021-10-15 ENCOUNTER — RESULTS ONLY (OUTPATIENT)
Dept: RADIATION ONCOLOGY | Facility: HOSPITAL | Age: 71
End: 2021-10-15

## 2021-10-15 ENCOUNTER — APPOINTMENT (OUTPATIENT)
Dept: RADIATION ONCOLOGY | Facility: HOSPITAL | Age: 71
End: 2021-10-15
Payer: MEDICARE

## 2021-10-15 LAB
RAD ONC ARIA COURSE ID: NORMAL
RAD ONC ARIA COURSE LAST TREATMENT DATE: NORMAL
RAD ONC ARIA COURSE START DATE: NORMAL
RAD ONC ARIA COURSE TREATMENT ELAPSED DAYS: 11
RAD ONC ARIA FIRST TREATMENT DATE: NORMAL
RAD ONC ARIA PLAN FRACTIONS TREATED TO DATE: 10
RAD ONC ARIA PLAN ID: NORMAL
RAD ONC ARIA PLAN NAME: NORMAL
RAD ONC ARIA PLAN PRESCRIBED DOSE PER FRACTION: 1.8 GY
RAD ONC ARIA PLAN TOTAL FRACTIONS PRESCRIBED: 25
RAD ONC ARIA PLAN TOTAL PRESCRIBED DOSE: 4500 CGY
RAD ONC ARIA REFERENCE POINT DOSAGE GIVEN TO DATE: NORMAL GY
RAD ONC ARIA REFERENCE POINT ID: NORMAL

## 2021-10-15 PROCEDURE — 77336 RADIATION PHYSICS CONSULT: CPT | Performed by: RADIOLOGY

## 2021-10-15 PROCEDURE — 77427 RADIATION TX MANAGEMENT X5: CPT | Performed by: RADIOLOGY

## 2021-10-15 PROCEDURE — 77014 PR CT GUIDE FOR PLACEMENT RADIATION THERAPY FIELDS: CPT | Mod: 26 | Performed by: RADIOLOGY

## 2021-10-15 PROCEDURE — 77386 HC IMRT TREATMENT DELIVERY, COMPLEX: CPT | Performed by: RADIOLOGY

## 2021-10-18 ENCOUNTER — APPOINTMENT (OUTPATIENT)
Dept: RADIATION ONCOLOGY | Facility: HOSPITAL | Age: 71
End: 2021-10-18
Payer: MEDICARE

## 2021-10-18 ENCOUNTER — RESULTS ONLY (OUTPATIENT)
Dept: RADIATION ONCOLOGY | Facility: HOSPITAL | Age: 71
End: 2021-10-18

## 2021-10-18 LAB
RAD ONC ARIA COURSE ID: NORMAL
RAD ONC ARIA COURSE LAST TREATMENT DATE: NORMAL
RAD ONC ARIA COURSE START DATE: NORMAL
RAD ONC ARIA COURSE TREATMENT ELAPSED DAYS: 14
RAD ONC ARIA FIRST TREATMENT DATE: NORMAL
RAD ONC ARIA PLAN FRACTIONS TREATED TO DATE: 11
RAD ONC ARIA PLAN ID: NORMAL
RAD ONC ARIA PLAN NAME: NORMAL
RAD ONC ARIA PLAN PRESCRIBED DOSE PER FRACTION: 1.8 GY
RAD ONC ARIA PLAN TOTAL FRACTIONS PRESCRIBED: 25
RAD ONC ARIA PLAN TOTAL PRESCRIBED DOSE: 4500 CGY
RAD ONC ARIA REFERENCE POINT DOSAGE GIVEN TO DATE: NORMAL GY
RAD ONC ARIA REFERENCE POINT ID: NORMAL

## 2021-10-18 PROCEDURE — 77014 PR CT GUIDE FOR PLACEMENT RADIATION THERAPY FIELDS: CPT | Mod: 26 | Performed by: RADIOLOGY

## 2021-10-18 PROCEDURE — 77386 HC IMRT TREATMENT DELIVERY, COMPLEX: CPT | Performed by: RADIOLOGY

## 2021-10-19 ENCOUNTER — OFFICE VISIT (OUTPATIENT)
Dept: RADIATION ONCOLOGY | Facility: HOSPITAL | Age: 71
End: 2021-10-19
Payer: MEDICARE

## 2021-10-19 ENCOUNTER — RESULTS ONLY (OUTPATIENT)
Dept: RADIATION ONCOLOGY | Facility: HOSPITAL | Age: 71
End: 2021-10-19

## 2021-10-19 ENCOUNTER — APPOINTMENT (OUTPATIENT)
Dept: RADIATION ONCOLOGY | Facility: HOSPITAL | Age: 71
End: 2021-10-19
Payer: MEDICARE

## 2021-10-19 VITALS — WEIGHT: 117.2 LBS | HEIGHT: 66 IN | BODY MASS INDEX: 18.84 KG/M2

## 2021-10-19 DIAGNOSIS — C20 MALIGNANT NEOPLASM OF RECTUM (H): Primary | ICD-10-CM

## 2021-10-19 LAB
RAD ONC ARIA COURSE ID: NORMAL
RAD ONC ARIA COURSE LAST TREATMENT DATE: NORMAL
RAD ONC ARIA COURSE START DATE: NORMAL
RAD ONC ARIA COURSE TREATMENT ELAPSED DAYS: 15
RAD ONC ARIA FIRST TREATMENT DATE: NORMAL
RAD ONC ARIA PLAN FRACTIONS TREATED TO DATE: 12
RAD ONC ARIA PLAN ID: NORMAL
RAD ONC ARIA PLAN NAME: NORMAL
RAD ONC ARIA PLAN PRESCRIBED DOSE PER FRACTION: 1.8 GY
RAD ONC ARIA PLAN TOTAL FRACTIONS PRESCRIBED: 25
RAD ONC ARIA PLAN TOTAL PRESCRIBED DOSE: 4500 CGY
RAD ONC ARIA REFERENCE POINT DOSAGE GIVEN TO DATE: NORMAL GY
RAD ONC ARIA REFERENCE POINT ID: NORMAL

## 2021-10-19 PROCEDURE — 77386 HC IMRT TREATMENT DELIVERY, COMPLEX: CPT | Performed by: RADIOLOGY

## 2021-10-19 PROCEDURE — 77014 PR CT GUIDE FOR PLACEMENT RADIATION THERAPY FIELDS: CPT | Mod: 26 | Performed by: RADIOLOGY

## 2021-10-19 ASSESSMENT — PAIN SCALES - GENERAL: PAINLEVEL: NO PAIN (0)

## 2021-10-19 ASSESSMENT — MIFFLIN-ST. JEOR: SCORE: 1065.43

## 2021-10-19 NOTE — PROGRESS NOTES
[facility]  DEPARTMENT OF RADIATION ONCOLOGY   ON TREATMENT VISIT (OTV) NOTE    Name: Yvette Singletary MRN: [id number]   : 1950 (71 year old)  Date of Service: Oct 19, 2021 Referring: Dr. Brothers     Diagnosis and Cancer Staging  Malignant neoplasm of rectum (H)  Staging form: Colon and Rectum, AJCC 8th Edition  - Clinical stage from 2021: Stage IIA (cT3, cN0, cM0) - Signed by Familia Crews MD on 2021    Primary malignant neoplasm of right middle lobe of lung (H)  Staging form: Lung, AJCC 8th Edition  - Clinical stage from 2021: Stage IA3 (cT1c, cN0, cM0) - Signed by Familia Crews MD on 10/19/2021      Radiation Therapy   Site: Pelvis.   Fraction(s) = Dose (cGy)    Received 12 = 2160 (at 180 each)    Remaining 16 + 3 fractions    Goal 28 (25 + 3) = 5040 (4500 + 540)     Site: Right middle lobe (stereotactic body radiotherapy (SBRT)).   Fraction(s) = Dose (cGy)    Received Pending after pelvic XRT    Remaining Pending    Goal Pending   (formatted for [employer])    Concurrent chemoradiation week 0 (fractions 1-5): Grade 0 toxicity due to radiation therapy (CTCAE 5.0).   Week 1 (-10): Grade 0 toxicity.  Week 2 (11-15): Grade 2 bowel toxicity likely due to radiation therapy (diarrhea requiring Lomotil; did not occur during neoadjuvant FOLFOX).    Recent History (E)   General: Arrived alone to examination area. Receiving concurrent chemoradiation with weekly infusional 5-FU (C1D1 10/4/2021). GI as below. No new complaints. Patient again spoke at length that she feels much better since starting radiation therapy. Reports dramatically improved quality of life and sense of well being. Feeling better with each passing day. Dramatic improvement of energy has permitted the patient to stop using a wheelchair, always walk independently, and enjoy the outdoors and weather. Going brisk walks. Much better sleep, appetite, eating, and other activities.    Primary issues:   1. GI: Bowel  "habits remain much better than prior to chemotherapy and during the early portion of chemotherapy. Better than last week (3 bouts of watery, loose diarrhea during the day and 3-4 bouts during the night; minimal cramping). This week, down to 2 very loose (3:00 am and 5:00 am), almost watery stools that occur overnight by 5:00 am. Taking loperamide as below. Denies cramping, bleeding, nauseas, vomiting, or foul odor stools. No mucous discharge. No rectal or anal pain.  Index: Grade 2 due to radiation therapy vs. disease  Intervention: Taking 6 tablets per day of loperamide including 2 in the morning (precaution against stools during radiation therapy) and 2 before bedtime (precaution against stools throughout the night). Anticipates that stopping drinking milk will be challenging (part of her lifetime daily routine).   Impression: Doing well compared to baseline. Tolerating chemoradiation well despite the baseline bowel compromises. Anticipate possible additional progression over the coming weeks.  2. Fatigue:  Much less this week (last week, had new report of decreased energy level at times and earlier bedtime). Baseline activities had included physical therapy, reading, baking, and household chores. No longer needs a wheelchair for long distances due to getting fatigued (e.g, in a store, needs something to hold onto if walking longer distances).   Index: Grade 0.  Intervention: Observe.  Impression: Much improved as chemoradiation continues.  3. :  Denies significant changes from baseline. \"Maybe\" is urinating less during the day. No reported dysuria, hematuria, leakage, or incontinence.   Index: Grade 0.  Intervention: AlphaBlockerVsObserve.   Impression: Possible changes within 1-2 weeks.  4. Lung cancer: Patient remains eager to proceed with arrangements for SBRT for her right middle lobe primary cancer. Reviewed the schedule of simulation after completion of pelvic radiation therapy and anticipated start of " SBRT within approximately 10 days after simulation. Again reviewed the relative risks, benefits, rationale, potential side effects, alternatives, adjuncts, and adjuvants to radiation therapy to the right lung after preoperative concurrent chemoradiation for rectal cancer. Patient wished to proceed with arrangements and treatment. Medical oncology aware (patient also spoke with medical oncology about the sequence of treatments).    Related active issues:   5. ID: Daily COVID-19 screening negative for barriers to proceeding with radiation therapy.  6. Follow-up: After radiation therapy, refer back to  medical oncology and colorectal surgery for scheduling of surgery.    Past Medical History:   Diagnosis Date     Cancer (H)      COPD (chronic obstructive pulmonary disease) (H)      History of blood transfusion      Past Surgical History:   Procedure Laterality Date     hip replacements Bilateral      Outpatient Medications Prior to Visit   Medication Sig Dispense Refill     acetaminophen (TYLENOL) 325 MG tablet Take 325-650 mg by mouth every 6 hours as needed (Patient not taking: Reported on 2021)       amylase-lipase-protease (CREON 36) [id number] units CPEP Take 36,000 Units by mouth 3 times daily (with meals)       loperamide (IMODIUM) 2 MG capsule Take 2 mg by mouth 4 times daily as needed       omeprazole (PRILOSEC) 20 MG DR capsule Take 20 mg by mouth daily       prochlorperazine (COMPAZINE) 10 MG tablet Take 10 mg by mouth as needed       umeclidinium-vilanterol (ANORO ELLIPTA) 62.5-25 MCG/INH oral inhaler Inhale 1 puff into the lungs daily       No facility-administered medications prior to visit.     Allergies/Reactions: Fentanyl and related    No orders of the defined types were placed in this encounter.    Social History     Tobacco Use     Smoking status: Former Smoker     Types: Cigarettes     Quit date: 2021     Years since quittin.7     Smokeless tobacco: Never Used   Substance Use  "Topics     Alcohol use: Yes     Comment: 8 drinks a week/ gin     Drug use: Not Currently     Types: Marijuana     Family History   Problem Relation Age of Onset     Cerebrovascular Disease Father    No cancers in first or second degree relatives.    ROS (score of 0 indicates that symptom is at baseline for most sections below)   See Flowsheet for additional comments as indicated.  No Pain (0)                               Physical Examination   Vitals: Ht 1.664 m (5' 5.5\")   Wt 53.2 kg (117 lb 3.2 oz)   BMI 19.21 kg/m    Wt Readings from Last 3 Encounters:   10/19/21 53.2 kg (117 lb 3.2 oz)   10/13/21 53.5 kg (118 lb)   10/06/21 52.1 kg (114 lb 12.8 oz)     Evaluation (during the week, additional physician evaluations at [facility] with the staff)):  KPS: 70 (cares for self but unable to carry on normal activity or do active work).  General: Continue to appear much more well than at consultation. Moving quickly and confidently. Appears clinically stable. Appears in fair general health. Appears thin but not gaunt or wasted. Appears rested. Appears stated age. Appears well-developed, well-nourished, and in no acute distress. Does not appear acutely or chronically ill. Appears well groomed.  Head: No alopecia. Normocephalic.  Eyes: Anicteric.  ENT: No mucositis. Good volume. No hoarseness.  Neck: Trachea midline.  Lymphatics: Deferred.  Chest/Breasts: Right chest wall port site is clean, dry, and intact. No erythema, discharge, or cellulitis. No implanted cardiac device.   Lungs: Easy respirations, no use of accessory muscles. Clear to auscultation.  Cardiovascular: No jugulovenous distension. No carotid pulsations. RRR, S1, S2.  Abdomen: No erythema. Nondistended, nontender. Soft. Bowel sounds positive.  Pelvis: [place]. No erythema. Nondistended, nontender. Soft. Bowel sounds positive.  MSK: Shoulder range of motion is . No arm edema or hand edema. No cords or calf tenderness.  Integument: No jaundice or pallor. No " cellulitis.  Neurologic: Strong, rapid gait. Right-handed. Alert, oriented, fluent. Memory intact. Motor intact. Sensory intact. No ataxia.  Psychologic: Broadly smiling. Chatty. Bright, upbeat, relaxed, confident, engaging, and motivated. Pleasant, cooperative, insightful, and concrete.    Physician Radiation Therapy Impression   Routine tolerance to radiation therapy.    Physician Radiation Therapy Plan   No new interventions. Boost/cone down pending. Reviewed graphical 3D plan with attention to dose to the rectum, small bowel, large bowel, bladder, pelvic bones, etc. Reviewed the large volume of stool and gas in the treatment planning CT, daily cone beam CT, and associated elevated risk of pelvic toxicity from radiation therapy (e.g., nausea, vomiting, cramping, diarrhea, etc) as well as complications after surgery. Reviewed anticipated time course, nature, and potential interventions for managing toxicity during and after radiation therapy. Patient aware of onsite and telemedicine coverage of our clinic. She wished to proceed with treatment. All questions answered to the satisfaction of the patient.    Physician Radiation Therapy Weekly Review   Reviewed available labs and diagnostic studies. Interpreted as adequate to proceed with radiation therapy. Discussed management with Therapy, Treatment Planning, and Nursing. Reviewed weekly routine QA, linac imaging, and clinical set up are adequate to proceed with radiation therapy as prescribed.      Familia Crews MD, PhD  Department of Radiation Oncology  Tel: [phone]  Fax: [phone]

## 2021-10-20 ENCOUNTER — DOCUMENTATION ONLY (OUTPATIENT)
Dept: RADIATION ONCOLOGY | Facility: HOSPITAL | Age: 71
End: 2021-10-20

## 2021-10-20 NOTE — PROGRESS NOTES
Radiation Oncology - Telephone Call (9:17 am)      Notify by the staff that the patient had called to cancel radiation therapy due to feeling dizzy, nauseated, and unwell. Telephone the patient that her preferred contact number home). Left a voicemail message with my contact information.    Addendum 10/20/2021 (3:30 pm): At 2:05 PM, the patient left a voicemail message with the staff. She reported that she is feeling better and plans to resume radiation therapy tomorrow. At the visit, the patient will be evaluated at the linac and in clinic.      Familia Crews M.D., Ph.D.  Department of Radiation Oncology  Tel: [phone]  Fax: [phone]

## 2021-10-21 ENCOUNTER — APPOINTMENT (OUTPATIENT)
Dept: RADIATION ONCOLOGY | Facility: HOSPITAL | Age: 71
End: 2021-10-21
Payer: MEDICARE

## 2021-10-21 ENCOUNTER — RESULTS ONLY (OUTPATIENT)
Dept: RADIATION ONCOLOGY | Facility: HOSPITAL | Age: 71
End: 2021-10-21

## 2021-10-21 ENCOUNTER — OFFICE VISIT (OUTPATIENT)
Dept: RADIATION ONCOLOGY | Facility: HOSPITAL | Age: 71
End: 2021-10-21

## 2021-10-21 VITALS — WEIGHT: 117.1 LBS | BODY MASS INDEX: 19.19 KG/M2

## 2021-10-21 DIAGNOSIS — C20 MALIGNANT NEOPLASM OF RECTUM (H): Primary | ICD-10-CM

## 2021-10-21 LAB
RAD ONC ARIA COURSE ID: NORMAL
RAD ONC ARIA COURSE LAST TREATMENT DATE: NORMAL
RAD ONC ARIA COURSE START DATE: NORMAL
RAD ONC ARIA COURSE TREATMENT ELAPSED DAYS: 17
RAD ONC ARIA FIRST TREATMENT DATE: NORMAL
RAD ONC ARIA PLAN FRACTIONS TREATED TO DATE: 13
RAD ONC ARIA PLAN ID: NORMAL
RAD ONC ARIA PLAN NAME: NORMAL
RAD ONC ARIA PLAN PRESCRIBED DOSE PER FRACTION: 1.8 GY
RAD ONC ARIA PLAN TOTAL FRACTIONS PRESCRIBED: 25
RAD ONC ARIA PLAN TOTAL PRESCRIBED DOSE: 4500 CGY
RAD ONC ARIA REFERENCE POINT DOSAGE GIVEN TO DATE: NORMAL GY
RAD ONC ARIA REFERENCE POINT ID: NORMAL

## 2021-10-21 PROCEDURE — 77014 PR CT GUIDE FOR PLACEMENT RADIATION THERAPY FIELDS: CPT | Mod: 26 | Performed by: RADIOLOGY

## 2021-10-21 PROCEDURE — 77386 HC IMRT TREATMENT DELIVERY, COMPLEX: CPT | Performed by: RADIOLOGY

## 2021-10-21 RX ORDER — DIPHENOXYLATE HCL/ATROPINE 2.5-.025MG
2 TABLET ORAL 4 TIMES DAILY PRN
Qty: 56 TABLET | Refills: 1 | Status: SHIPPED | OUTPATIENT
Start: 2021-10-21 | End: 2021-11-04

## 2021-10-21 ASSESSMENT — ENCOUNTER SYMPTOMS
ABDOMINAL PAIN: 0
RECTAL PAIN: 0
DIZZINESS: 1
BLOOD IN STOOL: 0
DIARRHEA: 1
FATIGUE: 0
COLOR CHANGE: 0
UNEXPECTED WEIGHT CHANGE: 0
ACTIVITY CHANGE: 0
APPETITE CHANGE: 0
NAUSEA: 1
SHORTNESS OF BREATH: 0
FEVER: 0

## 2021-10-21 NOTE — PROGRESS NOTES
[facility]  DEPARTMENT OF RADIATION ONCOLOGY   ON TREATMENT VISIT (OTV) NOTE    Name: Yvette Singletary MRN: [id number]   : 1950 (71 year old)  Date of Service: Oct 21, 2021 Referring: Dr. Borthers     Diagnosis and Cancer Staging  Malignant neoplasm of rectum (H)  Staging form: Colon and Rectum, AJCC 8th Edition  - Clinical stage from 2021: Stage IIA (cT3, cN0, cM0) - Signed by Familia Crews MD on 2021    Primary malignant neoplasm of right middle lobe of lung (H)  Staging form: Lung, AJCC 8th Edition  - Clinical stage from 2021: Stage IA3 (cT1c, cN0, cM0) - Signed by Familia Crews MD on 10/19/2021      Radiation Therapy   Site: Pelvis.   Fraction(s) = Dose (cGy)    Received 13 = 2340 (at 180 each)    Remaining 12 + 3 fractions    Goal 28 (25 + 3) = 5040 (4500 + 540)     Site: Right middle lobe (stereotactic body radiotherapy (SBRT)).   Fraction(s) = Dose (cGy)    Received Pending after pelvic XRT    Remaining Pending    Goal Pending   (formatted for code-laboration)    Concurrent chemoradiation week 0 (fractions 1-5): Grade 0 toxicity due to radiation therapy (CTCAE 5.0).   Week 1 (-10): Grade 0 toxicity.  Week 2 (11-15): Grade 2 bowel toxicity likely due to radiation therapy (diarrhea requiring Lomotil; did not occur during neoadjuvant FOLFOX).    Recent History (E)   General: Arrived alone to examination area. Receiving concurrent chemoradiation with weekly infusional 5-FU (C1D1 10/4/2021). GI as below. No new complaints. Patient again spoke at length that she feels much better since starting radiation therapy. Reports dramatically improved quality of life and sense of well being. Feeling better with each passing day. Dramatic improvement of energy has permitted the patient to stop using a wheelchair, always walk independently, and enjoy the outdoors and weather. Going brisk walks. Much better sleep, appetite, eating, and other activities.    Primary issues:   1. GI:* Today  reports having a difficult day yesterday (10/20/21), having completely watery stools that were uncontainable. She explains taking 8 imodium, and is requesting for a new solution. Reports foul smelling diarrhea, nausea yesterday, feels fine today. Denies cramping, mucous discharge, rectal or anal pain.      ((Bowel habits remain much better than prior to chemotherapy and during the early portion of chemotherapy. Better than last week (3 bouts of watery, loose diarrhea during the day and 3-4 bouts during the night; minimal cramping). Early this week, down to 2 very loose (3:00 am and 5:00 am), almost watery stools that occur overnight by 5:00 am. Taking loperamide as below.) Denies cramping, bleeding, nauseas, vomiting, or foul odor stools. No mucous discharge. No rectal or anal pain.))     Index: Grade 2 due to radiation therapy vs. disease  Intervention: Stop taking Imodium.  Begin taking Lomotil 4 times daily, will reevaluate on treatment days.Will begin utilizing the brat diet, will stop drinking milk, and will start new medication.  (was taking 8 tablets per day of loperamide including 2 in the morning (precaution against stools during radiation therapy) and 2 before bedtime (precaution against stools throughout the night)). Anticipates that stopping drinking milk will be challenging (part of her lifetime daily routine).  Impression: Doing well compared to baseline. Tolerating chemoradiation well despite the baseline bowel compromises. Anticipate possible additional progression over the coming weeks.    2. Fatigue:  Much less this week (last week, had new report of decreased energy level at times and earlier bedtime). Baseline activities had included physical therapy, reading, baking, and household chores. No longer needs a wheelchair for long distances due to getting fatigued (e.g, in a store, needs something to hold onto if walking longer distances).  Does not comment on fatigue this visit, reports being much  "better today than yesterday appears well and in good spirits.  Index: Grade 0.  Intervention: Observe.  Impression: Much improved as chemoradiation continues.    3. :  Denies significant changes from baseline. \"Maybe\" is urinating less during the day. No reported dysuria, hematuria, leakage, or incontinence.   Index: Grade 0.  Intervention: AlphaBlockerVsObserve.   Impression: Possible changes within 1-2 weeks.    4. Lung cancer: Patient remains eager to proceed with arrangements for SBRT for her right middle lobe primary cancer. Reviewed the schedule of simulation after completion of pelvic radiation therapy and anticipated start of SBRT within approximately 10 days after simulation. Again reviewed the relative risks, benefits, rationale, potential side effects, alternatives, adjuncts, and adjuvants to radiation therapy to the right lung after preoperative concurrent chemoradiation for rectal cancer. Patient wished to proceed with arrangements and treatment. Medical oncology aware (patient also spoke with medical oncology about the sequence of treatments).    Related active issues:   5. ID: Daily COVID-19 screening negative for barriers to proceeding with radiation therapy.  6. Follow-up: After radiation therapy, refer back to  medical oncology and colorectal surgery for scheduling of surgery.    Past Medical History:   Diagnosis Date     Cancer (H)      COPD (chronic obstructive pulmonary disease) (H)      History of blood transfusion      Past Surgical History:   Procedure Laterality Date     hip replacements Bilateral      Outpatient Medications Prior to Visit   Medication Sig Dispense Refill     acetaminophen (TYLENOL) 325 MG tablet Take 325-650 mg by mouth every 6 hours as needed (Patient not taking: Reported on 9/14/2021)       amylase-lipase-protease (CREON 36) 602805-27159-792056 units CPEP Take 36,000 Units by mouth 3 times daily (with meals)       loperamide (IMODIUM) 2 MG capsule Take 2 mg by mouth 4 " times daily as needed       omeprazole (PRILOSEC) 20 MG DR capsule Take 20 mg by mouth daily       prochlorperazine (COMPAZINE) 10 MG tablet Take 10 mg by mouth as needed       umeclidinium-vilanterol (ANORO ELLIPTA) 62.5-25 MCG/INH oral inhaler Inhale 1 puff into the lungs daily       No facility-administered medications prior to visit.     Allergies/Reactions: Fentanyl and related    No orders of the defined types were placed in this encounter.    Social History     Tobacco Use     Smoking status: Former Smoker     Types: Cigarettes     Quit date: 2021     Years since quittin.7     Smokeless tobacco: Never Used   Substance Use Topics     Alcohol use: Yes     Comment: 8 drinks a week/ gin     Drug use: Not Currently     Types: Marijuana     Family History   Problem Relation Age of Onset     Cerebrovascular Disease Father    No cancers in first or second degree relatives.    ROS (score of 0 indicates that symptom is at baseline for most sections below)   See Flowsheet for additional comments as indicated.  Data Unavailable                             Review of Systems   Constitutional: Negative for activity change, appetite change (only changing diet d/t loose stools ), fatigue, fever and unexpected weight change.   Respiratory: Negative for shortness of breath.    Cardiovascular: Negative for leg swelling.   Gastrointestinal: Positive for diarrhea and nausea (yesterday 10/20/21). Negative for abdominal pain, blood in stool and rectal pain.   Skin: Negative for color change.   Neurological: Positive for dizziness (yesterday 10/20/21- during bouts of diarrhea, resolved after oral intake of fluids ).     Physical Examination   Vitals: Wt 53.1 kg (117 lb 1.6 oz)   BMI 19.19 kg/m    Wt Readings from Last 3 Encounters:   10/21/21 53.1 kg (117 lb 1.6 oz)   10/19/21 53.2 kg (117 lb 3.2 oz)   10/13/21 53.5 kg (118 lb)     Evaluation (during the week, additional physician evaluations at [facility] with the  staff)):  KPS: 70 (cares for self but unable to carry on normal activity or do active work).  General: Continue to appear much more well than at consultation. Moving quickly and confidently. Appears clinically stable. Appears in fair general health. Appears thin but not gaunt or wasted. Appears rested. Appears stated age. Appears well-developed, well-nourished, and in no acute distress. Does not appear acutely or chronically ill. Appears well groomed.  Head: No alopecia. Normocephalic.  Eyes: Anicteric.  ENT: No mucositis. Good volume. No hoarseness.  Neck: Trachea midline.  Lymphatics: Deferred.  Chest/Breasts: Right chest wall port site is clean, dry, and intact. No erythema, discharge, or cellulitis. No implanted cardiac device.   Lungs: Easy respirations, no use of accessory muscles. Clear to auscultation.  Cardiovascular: No jugulovenous distension. No carotid pulsations. RRR, S1, S2.  Abdomen: No erythema. Nondistended, nontender. Soft. Bowel sounds positive.  Pelvis: [place]. No erythema. Nondistended, nontender. Soft. Bowel sounds positive.  MSK: Shoulder range of motion is . No arm edema or hand edema. No cords or calf tenderness.  Integument: No jaundice or pallor. No cellulitis.  Neurologic: Strong, rapid gait. Right-handed. Alert, oriented, fluent. Memory intact. Motor intact. Sensory intact. No ataxia.  Psychologic: Broadly smiling. Chatty. Bright, upbeat, relaxed, confident, engaging, and motivated. Pleasant, cooperative, insightful, and concrete.    Physician Radiation Therapy Impression   Routine tolerance to radiation therapy.    Physician Radiation Therapy Plan   No new interventions. Boost/cone down pending. Reviewed graphical 3D plan with attention to dose to the rectum, small bowel, large bowel, bladder, pelvic bones, etc. Reviewed the large volume of stool and gas in the treatment planning CT, daily cone beam CT, and associated elevated risk of pelvic toxicity from radiation therapy (e.g.,  nausea, vomiting, cramping, diarrhea, etc) as well as complications after surgery. Reviewed anticipated time course, nature, and potential interventions for managing toxicity during and after radiation therapy. Patient aware of onsite and telemedicine coverage of our clinic. She wished to proceed with treatment. All questions answered to the satisfaction of the patient.    Physician Radiation Therapy Weekly Review   Reviewed available labs and diagnostic studies. Interpreted as adequate to proceed with radiation therapy. Discussed management with Therapy, Treatment Planning, and Nursing. Reviewed weekly routine QA, linac imaging, and clinical set up are adequate to proceed with radiation therapy as prescribed.      Familia Crews MD, PhD  Department of Radiation Oncology  Tel: [phone]  Fax: [phone]

## 2021-10-22 ENCOUNTER — RESULTS ONLY (OUTPATIENT)
Dept: RADIATION ONCOLOGY | Facility: HOSPITAL | Age: 71
End: 2021-10-22

## 2021-10-22 ENCOUNTER — APPOINTMENT (OUTPATIENT)
Dept: RADIATION ONCOLOGY | Facility: HOSPITAL | Age: 71
End: 2021-10-22
Payer: MEDICARE

## 2021-10-22 LAB
RAD ONC ARIA COURSE ID: NORMAL
RAD ONC ARIA COURSE LAST TREATMENT DATE: NORMAL
RAD ONC ARIA COURSE START DATE: NORMAL
RAD ONC ARIA COURSE TREATMENT ELAPSED DAYS: 18
RAD ONC ARIA FIRST TREATMENT DATE: NORMAL
RAD ONC ARIA PLAN FRACTIONS TREATED TO DATE: 14
RAD ONC ARIA PLAN ID: NORMAL
RAD ONC ARIA PLAN NAME: NORMAL
RAD ONC ARIA PLAN PRESCRIBED DOSE PER FRACTION: 1.8 GY
RAD ONC ARIA PLAN TOTAL FRACTIONS PRESCRIBED: 25
RAD ONC ARIA PLAN TOTAL PRESCRIBED DOSE: 4500 CGY
RAD ONC ARIA REFERENCE POINT DOSAGE GIVEN TO DATE: NORMAL GY
RAD ONC ARIA REFERENCE POINT ID: NORMAL

## 2021-10-22 PROCEDURE — 77386 HC IMRT TREATMENT DELIVERY, COMPLEX: CPT | Performed by: RADIOLOGY

## 2021-10-22 PROCEDURE — 77014 PR CT GUIDE FOR PLACEMENT RADIATION THERAPY FIELDS: CPT | Mod: 26 | Performed by: RADIOLOGY

## 2021-10-25 ENCOUNTER — RESULTS ONLY (OUTPATIENT)
Dept: RADIATION ONCOLOGY | Facility: HOSPITAL | Age: 71
End: 2021-10-25

## 2021-10-25 ENCOUNTER — APPOINTMENT (OUTPATIENT)
Dept: RADIATION ONCOLOGY | Facility: HOSPITAL | Age: 71
End: 2021-10-25
Payer: MEDICARE

## 2021-10-25 LAB
RAD ONC ARIA COURSE ID: NORMAL
RAD ONC ARIA COURSE LAST TREATMENT DATE: NORMAL
RAD ONC ARIA COURSE START DATE: NORMAL
RAD ONC ARIA COURSE TREATMENT ELAPSED DAYS: 21
RAD ONC ARIA FIRST TREATMENT DATE: NORMAL
RAD ONC ARIA PLAN FRACTIONS TREATED TO DATE: 15
RAD ONC ARIA PLAN ID: NORMAL
RAD ONC ARIA PLAN NAME: NORMAL
RAD ONC ARIA PLAN PRESCRIBED DOSE PER FRACTION: 1.8 GY
RAD ONC ARIA PLAN TOTAL FRACTIONS PRESCRIBED: 25
RAD ONC ARIA PLAN TOTAL PRESCRIBED DOSE: 4500 CGY
RAD ONC ARIA REFERENCE POINT DOSAGE GIVEN TO DATE: NORMAL GY
RAD ONC ARIA REFERENCE POINT ID: NORMAL

## 2021-10-25 PROCEDURE — 77336 RADIATION PHYSICS CONSULT: CPT | Performed by: RADIOLOGY

## 2021-10-25 PROCEDURE — 77386 HC IMRT TREATMENT DELIVERY, COMPLEX: CPT | Performed by: RADIOLOGY

## 2021-10-25 PROCEDURE — 77427 RADIATION TX MANAGEMENT X5: CPT | Performed by: RADIOLOGY

## 2021-10-25 PROCEDURE — 77014 PR CT GUIDE FOR PLACEMENT RADIATION THERAPY FIELDS: CPT | Mod: 26 | Performed by: RADIOLOGY

## 2021-10-26 ENCOUNTER — RESULTS ONLY (OUTPATIENT)
Dept: RADIATION ONCOLOGY | Facility: HOSPITAL | Age: 71
End: 2021-10-26

## 2021-10-26 ENCOUNTER — APPOINTMENT (OUTPATIENT)
Dept: RADIATION ONCOLOGY | Facility: HOSPITAL | Age: 71
End: 2021-10-26
Payer: MEDICARE

## 2021-10-26 LAB
RAD ONC ARIA COURSE ID: NORMAL
RAD ONC ARIA COURSE LAST TREATMENT DATE: NORMAL
RAD ONC ARIA COURSE START DATE: NORMAL
RAD ONC ARIA COURSE TREATMENT ELAPSED DAYS: 22
RAD ONC ARIA FIRST TREATMENT DATE: NORMAL
RAD ONC ARIA PLAN FRACTIONS TREATED TO DATE: 16
RAD ONC ARIA PLAN ID: NORMAL
RAD ONC ARIA PLAN NAME: NORMAL
RAD ONC ARIA PLAN PRESCRIBED DOSE PER FRACTION: 1.8 GY
RAD ONC ARIA PLAN TOTAL FRACTIONS PRESCRIBED: 25
RAD ONC ARIA PLAN TOTAL PRESCRIBED DOSE: 4500 CGY
RAD ONC ARIA REFERENCE POINT DOSAGE GIVEN TO DATE: NORMAL GY
RAD ONC ARIA REFERENCE POINT ID: NORMAL

## 2021-10-26 PROCEDURE — 77386 HC IMRT TREATMENT DELIVERY, COMPLEX: CPT | Performed by: RADIOLOGY

## 2021-10-26 PROCEDURE — 77014 PR CT GUIDE FOR PLACEMENT RADIATION THERAPY FIELDS: CPT | Mod: 26 | Performed by: RADIOLOGY

## 2021-10-27 ENCOUNTER — OFFICE VISIT (OUTPATIENT)
Dept: RADIATION ONCOLOGY | Facility: HOSPITAL | Age: 71
End: 2021-10-27
Attending: RADIOLOGY
Payer: MEDICARE

## 2021-10-27 ENCOUNTER — RESULTS ONLY (OUTPATIENT)
Dept: RADIATION ONCOLOGY | Facility: HOSPITAL | Age: 71
End: 2021-10-27

## 2021-10-27 ENCOUNTER — APPOINTMENT (OUTPATIENT)
Dept: RADIATION ONCOLOGY | Facility: HOSPITAL | Age: 71
End: 2021-10-27
Payer: MEDICARE

## 2021-10-27 VITALS
WEIGHT: 115.4 LBS | BODY MASS INDEX: 18.91 KG/M2 | DIASTOLIC BLOOD PRESSURE: 70 MMHG | RESPIRATION RATE: 16 BRPM | HEART RATE: 89 BPM | OXYGEN SATURATION: 95 % | SYSTOLIC BLOOD PRESSURE: 115 MMHG | TEMPERATURE: 99 F

## 2021-10-27 DIAGNOSIS — C20 MALIGNANT NEOPLASM OF RECTUM (H): Primary | ICD-10-CM

## 2021-10-27 LAB
RAD ONC ARIA COURSE ID: NORMAL
RAD ONC ARIA COURSE LAST TREATMENT DATE: NORMAL
RAD ONC ARIA COURSE START DATE: NORMAL
RAD ONC ARIA COURSE TREATMENT ELAPSED DAYS: 23
RAD ONC ARIA FIRST TREATMENT DATE: NORMAL
RAD ONC ARIA PLAN FRACTIONS TREATED TO DATE: 17
RAD ONC ARIA PLAN ID: NORMAL
RAD ONC ARIA PLAN NAME: NORMAL
RAD ONC ARIA PLAN PRESCRIBED DOSE PER FRACTION: 1.8 GY
RAD ONC ARIA PLAN TOTAL FRACTIONS PRESCRIBED: 25
RAD ONC ARIA PLAN TOTAL PRESCRIBED DOSE: 4500 CGY
RAD ONC ARIA REFERENCE POINT DOSAGE GIVEN TO DATE: NORMAL GY
RAD ONC ARIA REFERENCE POINT ID: NORMAL

## 2021-10-27 PROCEDURE — 77014 PR CT GUIDE FOR PLACEMENT RADIATION THERAPY FIELDS: CPT | Mod: 26 | Performed by: RADIOLOGY

## 2021-10-27 PROCEDURE — 77386 HC IMRT TREATMENT DELIVERY, COMPLEX: CPT | Performed by: RADIOLOGY

## 2021-10-27 RX ORDER — POTASSIUM CHLORIDE 1500 MG/1
20 TABLET, EXTENDED RELEASE ORAL 2 TIMES DAILY
COMMUNITY
Start: 2021-10-25 | End: 2022-05-23

## 2021-10-27 RX ORDER — DIPHENOXYLATE HCL/ATROPINE 2.5-.025MG
1 TABLET ORAL 4 TIMES DAILY PRN
COMMUNITY
End: 2022-05-23

## 2021-10-27 ASSESSMENT — PAIN SCALES - GENERAL: PAINLEVEL: NO PAIN (0)

## 2021-10-27 NOTE — NURSING NOTE
"Oncology Rooming Note    October 27, 2021 10:35 AM   Yvette Singletary is a 71 year old female who presents for:    No chief complaint on file.    Initial Vitals: /70   Pulse 89   Temp 99  F (37.2  C)   Resp 16   Wt 52.3 kg (115 lb 6.4 oz)   SpO2 95%   BMI 18.91 kg/m   Estimated body mass index is 18.91 kg/m  as calculated from the following:    Height as of 10/19/21: 1.664 m (5' 5.5\").    Weight as of this encounter: 52.3 kg (115 lb 6.4 oz). Body surface area is 1.55 meters squared.  No Pain (0) Comment: Data Unavailable   No LMP recorded.  Allergies reviewed: Yes  Medications reviewed: Yes    Medications: Medication refills not needed today.  Pharmacy name entered into [facility]: [facility] - [address]    Clinical concerns: ROS (score of 0 indicates that symptom is at baseline for most sections below): See Flowsheet for additional comments as indicated.  No Pain (0)  Nutrition Alteration  Diet Type: Patient's Preference  Anorexia NCI: 1 - Loss of appetite  Skin  Skin Reaction: 1 - Faint erythema or dry desquamation     ENT and Mouth Exam  Mucositis - Current: 0 - None   Mucositis - Internal Severity: 0 - None   Esophagitis: 0 - None     Gastrointestinal  Diarrhea: 4 - Nine or more soft or liquid bowel movements per day  Weight loss:  (2 lbs wt loss)  Genitourinary  Urinary Status: 0 - Normal  Psychosocial  Mood - Anxiety: 0 - Normal  Pain Assessment  0-10 Pain Scale: 0       Ayah Dobson RN              "

## 2021-10-27 NOTE — PROGRESS NOTES
Progress Notes  Encounter Date: Oct 27, 2021  Joy Diaz MD     RADIATION ONCOLOGY WEEKLY MANAGEMENT PROGRESS NOTE     Patient Care Team       Relationship Specialty Notifications Start End    Chauncey-Beverley Stephens PCP - General Nurse Practitioner  9/10/21     Phone: [phone] Fax: [phone]         [facility] [address] [facility] [address]    Familia Crews MD MD Radiation Oncology  9/10/21     Phone: [phone] Fax: [phone]         [address] [facility] [address]    Dinah Mccarty MD MD Emergency Medicine  9/10/21     Phone: [phone] Fax: [phone]         [facility] [address] [place] [id number]    Monae Haines MD, MD MD Colon & Rectal  9/10/21     Phone: [phone] Fax: [phone]         [facility] [address] [place] [id number]    Viraj Brothers MD MD Hematology  9/10/21     Phone: [phone] Fax: [phone]         [facility] [address] [place] [id number]                  DIAGNOSIS: Rectal carcinoma stage IIa history of right middle lobe lung cancer stage IA3.      RADIATION THERAPY:    Yvette Singletary has received 30.6 Gy to date to the pelvis.       SUBJECTIVE:    Currently She notes she has diarrhea which responds to Lomotil.  She recently saw Dr. Brothers who recommend she remove dairy from her diet.  She is currently moving her bowels about 4 times per day.  She reports an adequate to good appetite and she managed to regain about 10 pounds she lost during chemo.         OBJECTIVE:    WEIGHT: 115 lbs 6.4 oz.  Examination reveals a well-developed thin female no apparent distress.  Awake alert and oriented and a good medical historian.        IMPRESSION:  Routine tolerance to radiation therapy.  Continue treatment as planned.  The patient reports she will see Dr. Brothers on Monday      PLAN:  Continue treatment as planned .        Medical record and imaging reviewed.

## 2021-10-28 ENCOUNTER — APPOINTMENT (OUTPATIENT)
Dept: RADIATION ONCOLOGY | Facility: HOSPITAL | Age: 71
End: 2021-10-28
Payer: MEDICARE

## 2021-10-28 ENCOUNTER — RESULTS ONLY (OUTPATIENT)
Dept: RADIATION ONCOLOGY | Facility: HOSPITAL | Age: 71
End: 2021-10-28

## 2021-10-28 LAB
RAD ONC ARIA COURSE ID: NORMAL
RAD ONC ARIA COURSE LAST TREATMENT DATE: NORMAL
RAD ONC ARIA COURSE START DATE: NORMAL
RAD ONC ARIA COURSE TREATMENT ELAPSED DAYS: 24
RAD ONC ARIA FIRST TREATMENT DATE: NORMAL
RAD ONC ARIA PLAN FRACTIONS TREATED TO DATE: 18
RAD ONC ARIA PLAN ID: NORMAL
RAD ONC ARIA PLAN NAME: NORMAL
RAD ONC ARIA PLAN PRESCRIBED DOSE PER FRACTION: 1.8 GY
RAD ONC ARIA PLAN TOTAL FRACTIONS PRESCRIBED: 25
RAD ONC ARIA PLAN TOTAL PRESCRIBED DOSE: 4500 CGY
RAD ONC ARIA REFERENCE POINT DOSAGE GIVEN TO DATE: NORMAL GY
RAD ONC ARIA REFERENCE POINT ID: NORMAL

## 2021-10-28 PROCEDURE — 77386 HC IMRT TREATMENT DELIVERY, COMPLEX: CPT | Performed by: RADIOLOGY

## 2021-10-28 PROCEDURE — 77014 PR CT GUIDE FOR PLACEMENT RADIATION THERAPY FIELDS: CPT | Mod: 26 | Performed by: RADIOLOGY

## 2021-10-29 ENCOUNTER — APPOINTMENT (OUTPATIENT)
Dept: RADIATION ONCOLOGY | Facility: HOSPITAL | Age: 71
End: 2021-10-29
Payer: MEDICARE

## 2021-10-29 ENCOUNTER — RESULTS ONLY (OUTPATIENT)
Dept: RADIATION ONCOLOGY | Facility: HOSPITAL | Age: 71
End: 2021-10-29

## 2021-10-29 LAB
RAD ONC ARIA COURSE ID: NORMAL
RAD ONC ARIA COURSE LAST TREATMENT DATE: NORMAL
RAD ONC ARIA COURSE START DATE: NORMAL
RAD ONC ARIA COURSE TREATMENT ELAPSED DAYS: 25
RAD ONC ARIA FIRST TREATMENT DATE: NORMAL
RAD ONC ARIA PLAN FRACTIONS TREATED TO DATE: 19
RAD ONC ARIA PLAN ID: NORMAL
RAD ONC ARIA PLAN NAME: NORMAL
RAD ONC ARIA PLAN PRESCRIBED DOSE PER FRACTION: 1.8 GY
RAD ONC ARIA PLAN TOTAL FRACTIONS PRESCRIBED: 25
RAD ONC ARIA PLAN TOTAL PRESCRIBED DOSE: 4500 CGY
RAD ONC ARIA REFERENCE POINT DOSAGE GIVEN TO DATE: NORMAL GY
RAD ONC ARIA REFERENCE POINT ID: NORMAL

## 2021-10-29 PROCEDURE — 77014 PR CT GUIDE FOR PLACEMENT RADIATION THERAPY FIELDS: CPT | Mod: 26 | Performed by: RADIOLOGY

## 2021-10-29 PROCEDURE — 77427 RADIATION TX MANAGEMENT X5: CPT | Performed by: RADIOLOGY

## 2021-10-29 PROCEDURE — 77386 HC IMRT TREATMENT DELIVERY, COMPLEX: CPT | Performed by: RADIOLOGY

## 2021-11-01 ENCOUNTER — RESULTS ONLY (OUTPATIENT)
Dept: RADIATION ONCOLOGY | Facility: HOSPITAL | Age: 71
End: 2021-11-01

## 2021-11-01 ENCOUNTER — APPOINTMENT (OUTPATIENT)
Dept: RADIATION ONCOLOGY | Facility: HOSPITAL | Age: 71
End: 2021-11-01
Attending: RADIOLOGY
Payer: MEDICARE

## 2021-11-01 LAB
RAD ONC ARIA COURSE ID: NORMAL
RAD ONC ARIA COURSE LAST TREATMENT DATE: NORMAL
RAD ONC ARIA COURSE START DATE: NORMAL
RAD ONC ARIA COURSE TREATMENT ELAPSED DAYS: 28
RAD ONC ARIA FIRST TREATMENT DATE: NORMAL
RAD ONC ARIA PLAN FRACTIONS TREATED TO DATE: 20
RAD ONC ARIA PLAN ID: NORMAL
RAD ONC ARIA PLAN NAME: NORMAL
RAD ONC ARIA PLAN PRESCRIBED DOSE PER FRACTION: 1.8 GY
RAD ONC ARIA PLAN TOTAL FRACTIONS PRESCRIBED: 25
RAD ONC ARIA PLAN TOTAL PRESCRIBED DOSE: 4500 CGY
RAD ONC ARIA REFERENCE POINT DOSAGE GIVEN TO DATE: NORMAL GY
RAD ONC ARIA REFERENCE POINT ID: NORMAL

## 2021-11-01 PROCEDURE — 77336 RADIATION PHYSICS CONSULT: CPT | Performed by: RADIOLOGY

## 2021-11-01 PROCEDURE — 77014 PR CT GUIDE FOR PLACEMENT RADIATION THERAPY FIELDS: CPT | Mod: 26 | Performed by: RADIOLOGY

## 2021-11-01 PROCEDURE — 77386 HC IMRT TREATMENT DELIVERY, COMPLEX: CPT | Performed by: RADIOLOGY

## 2021-11-02 ENCOUNTER — APPOINTMENT (OUTPATIENT)
Dept: RADIATION ONCOLOGY | Facility: HOSPITAL | Age: 71
End: 2021-11-02
Payer: MEDICARE

## 2021-11-02 ENCOUNTER — RESULTS ONLY (OUTPATIENT)
Dept: RADIATION ONCOLOGY | Facility: HOSPITAL | Age: 71
End: 2021-11-02

## 2021-11-02 LAB
RAD ONC ARIA COURSE ID: NORMAL
RAD ONC ARIA COURSE LAST TREATMENT DATE: NORMAL
RAD ONC ARIA COURSE START DATE: NORMAL
RAD ONC ARIA COURSE TREATMENT ELAPSED DAYS: 29
RAD ONC ARIA FIRST TREATMENT DATE: NORMAL
RAD ONC ARIA PLAN FRACTIONS TREATED TO DATE: 21
RAD ONC ARIA PLAN ID: NORMAL
RAD ONC ARIA PLAN NAME: NORMAL
RAD ONC ARIA PLAN PRESCRIBED DOSE PER FRACTION: 1.8 GY
RAD ONC ARIA PLAN TOTAL FRACTIONS PRESCRIBED: 25
RAD ONC ARIA PLAN TOTAL PRESCRIBED DOSE: 4500 CGY
RAD ONC ARIA REFERENCE POINT DOSAGE GIVEN TO DATE: NORMAL GY
RAD ONC ARIA REFERENCE POINT ID: NORMAL

## 2021-11-02 PROCEDURE — 77386 HC IMRT TREATMENT DELIVERY, COMPLEX: CPT | Performed by: RADIOLOGY

## 2021-11-02 PROCEDURE — 77014 PR CT GUIDE FOR PLACEMENT RADIATION THERAPY FIELDS: CPT | Mod: 26 | Performed by: RADIOLOGY

## 2021-11-03 ENCOUNTER — OFFICE VISIT (OUTPATIENT)
Dept: RADIATION ONCOLOGY | Facility: HOSPITAL | Age: 71
End: 2021-11-03
Attending: RADIOLOGY
Payer: MEDICARE

## 2021-11-03 ENCOUNTER — APPOINTMENT (OUTPATIENT)
Dept: RADIATION ONCOLOGY | Facility: HOSPITAL | Age: 71
End: 2021-11-03
Payer: MEDICARE

## 2021-11-03 ENCOUNTER — RESULTS ONLY (OUTPATIENT)
Dept: RADIATION ONCOLOGY | Facility: HOSPITAL | Age: 71
End: 2021-11-03

## 2021-11-03 DIAGNOSIS — C34.2 PRIMARY MALIGNANT NEOPLASM OF RIGHT MIDDLE LOBE OF LUNG (H): Primary | ICD-10-CM

## 2021-11-03 LAB
RAD ONC ARIA COURSE ID: NORMAL
RAD ONC ARIA COURSE LAST TREATMENT DATE: NORMAL
RAD ONC ARIA COURSE START DATE: NORMAL
RAD ONC ARIA COURSE TREATMENT ELAPSED DAYS: 30
RAD ONC ARIA FIRST TREATMENT DATE: NORMAL
RAD ONC ARIA PLAN FRACTIONS TREATED TO DATE: 22
RAD ONC ARIA PLAN ID: NORMAL
RAD ONC ARIA PLAN NAME: NORMAL
RAD ONC ARIA PLAN PRESCRIBED DOSE PER FRACTION: 1.8 GY
RAD ONC ARIA PLAN TOTAL FRACTIONS PRESCRIBED: 25
RAD ONC ARIA PLAN TOTAL PRESCRIBED DOSE: 4500 CGY
RAD ONC ARIA REFERENCE POINT DOSAGE GIVEN TO DATE: NORMAL GY
RAD ONC ARIA REFERENCE POINT ID: NORMAL

## 2021-11-03 PROCEDURE — 77014 PR CT GUIDE FOR PLACEMENT RADIATION THERAPY FIELDS: CPT | Mod: 26 | Performed by: RADIOLOGY

## 2021-11-03 PROCEDURE — 77386 HC IMRT TREATMENT DELIVERY, COMPLEX: CPT | Performed by: RADIOLOGY

## 2021-11-03 ASSESSMENT — ENCOUNTER SYMPTOMS
ANAL BLEEDING: 0
SHORTNESS OF BREATH: 0
VOMITING: 1
NAUSEA: 1
ABDOMINAL PAIN: 0
DIARRHEA: 1
APPETITE CHANGE: 0
BLOOD IN STOOL: 0

## 2021-11-03 NOTE — PROGRESS NOTES
[facility]  DEPARTMENT OF RADIATION ONCOLOGY   ON TREATMENT VISIT (OTV) NOTE    Name: Yvette Singletary MRN: [id number]   : 1950 (71 year old)  Date of Service: Nov 3, 2021 Referring: Dr. Brothers     Diagnosis and Cancer Staging  Malignant neoplasm of rectum (H)  Staging form: Colon and Rectum, AJCC 8th Edition  - Clinical stage from 2021: Stage IIA (cT3, cN0, cM0) - Signed by Familia Crews MD on 2021    Primary malignant neoplasm of right middle lobe of lung (H)  Staging form: Lung, AJCC 8th Edition  - Clinical stage from 2021: Stage IA3 (cT1c, cN0, cM0) - Signed by Familia Crews MD on 10/19/2021      Radiation Therapy   Site: Pelvis.   Fraction(s) = Dose (cGy)    Received 22 = 3960 (at 180 each)    Remaining 3 + 3 fractions    Goal 28 (25 + 3) = 5040 (4500 + 540)     Site: Right middle lobe (stereotactic body radiotherapy (SBRT)).   Fraction(s) = Dose (cGy)    Received Pending after pelvic XRT    Remaining Pending    Goal Pending   (formatted for "[employer]")    Concurrent chemoradiation week 0 (fractions 1-5): Grade 0 toxicity due to radiation therapy (CTCAE 5.0).   Week 1 (-10): Grade 0 toxicity.  Week 2 (-15): Grade 2 bowel toxicity likely due to radiation therapy (diarrhea requiring Lomotil; did not occur during neoadjuvant FOLFOX).  Week 3 (16-20): Grade 2 bowel toxicity likely due to radiation therapy (diarrhea requiring Lomotil; did not occur during neoadjuvant FOLFOX).  Week 4 (21-25): Grade 2 bowel toxicity likely due to radiation therapy (diarrhea requiring Lomotil and Imodium, frequency 4x/day, 3x/night)    Recent History (E)   General: Arrived alone to examination area. Receiving concurrent chemoradiation with weekly infusional 5-FU (C5D1 2021). GI as below. Feeling more tired, napping about an hour per day. (Baseline patient activity level reveals -Dramatic improvement of energy has permitted the patient to stop using a wheelchair, always walk  independently, and enjoy the outdoors and weather. Going brisk walks). She is no longer going for walks due to fatigue which she relates to the loose stools. When she is not having loose stools she is more inclined to increase her activity. Is sleeping well, recently lost her appetite (last week), is eating less daily (toast/ apple sauce or soup in one full day). She also explains this weekend she had two emesis bouts, and was feel nauseous all weekend and has continued into this week. Nausea is intermittent. Her weight is also down to 106.8lbs (9lbs) from last week. She is able to tolerate about 2 bottles of water/ day, a glass of almond milk/ day, and a glass of juice in a day. Has cut dairy completely out of her diet- did not seem to change her symptoms at all. She plans to slowly add dairy back into her diet. At this time, without much improvement with holding dairy, benefits outweigh the risks of her at least ingesting foods she likes/ tolerates which has a tendency to be diary type foods.     Primary issues:   1. GI: Bowel habits remain much better than prior to chemotherapy and during the early portion of chemotherapy. Better than last week (3 bouts of watery, loose diarrhea during the day and 3-4 bouts during the night; minimal cramping). This week, down to 2 very loose (3:00 am and 5:00 am), almost watery stools that occur overnight by 5:00 am. Taking loperamide as below. Denies cramping, bleeding, nauseas, vomiting, or foul odor stools. No mucous discharge. No rectal or anal pain. watery stools 4 times/ day and 3 times/ night. Using adult briefs due to 6 times .   Index: Grade 2 due to radiation therapy vs. disease  Intervention: Taking 6 tablets per day of loperamide including 2 in the morning (precaution against stools during radiation therapy) and 2 before bedtime (precaution against stools throughout the night). Anticipates that stopping drinking milk will be challenging (part of her lifetime daily  "routine).   Impression: Doing well compared to baseline. Tolerating chemoradiation well despite the baseline bowel compromises. Anticipate possible additional progression over the coming weeks.    2. Weight loss: Down 9 lbs from last week.   Index: Grade 1   Intervention: Observe. Discussed diet, increased caloric intake (such as boost, ensure, or breakfast essentials)   Impression: Possible changes in 1-2 weeks.  3. Fatigue: Much more this week (last week, had new report of decreased energy level at times and earlier bedtime). Baseline activities had included physical therapy, reading, baking, and household chores. No longer needs a wheelchair for long distances due to getting fatigued (e.g, in a store, needs something to hold onto if walking longer distances). Napping (30 min) 3 times per week  Index: Grade 0.  Intervention: Observe.  Impression: Much improved as chemoradiation continues.  3. :  Denies significant changes from baseline. \"Maybe\" is urinating less during the day. No reported dysuria, hematuria, leakage, or incontinence.   Index: Grade 0.  Intervention: [employer]Observe.   Impression: Possible changes within 1-2 weeks.  4. Lung cancer: Patient remains eager to proceed with arrangements for SBRT for her right middle lobe primary cancer. Reviewed the schedule of simulation after completion of pelvic radiation therapy and anticipated start of SBRT within approximately 10 days after simulation. Again reviewed the relative risks, benefits, rationale, potential side effects, alternatives, adjuncts, and adjuvants to radiation therapy to the right lung after preoperative concurrent chemoradiation for rectal cancer. Patient wished to proceed with arrangements and treatment. Medical oncology aware (patient also spoke with medical oncology about the sequence of treatments).    Related active issues:   5. ID: Daily COVID-19 screening negative for barriers to proceeding with radiation therapy.  6. " Follow-up: After radiation therapy, refer back to medical oncology and colorectal surgery for scheduling of surgery.    Past Medical History:   Diagnosis Date     Cancer (H)      COPD (chronic obstructive pulmonary disease) (H)      History of blood transfusion      Past Surgical History:   Procedure Laterality Date     hip replacements Bilateral      Outpatient Medications Prior to Visit   Medication Sig Dispense Refill     acetaminophen (TYLENOL) 325 MG tablet Take 325-650 mg by mouth every 6 hours as needed (Patient not taking: Reported on 2021)       amylase-lipase-protease (CREON 36) 129684-54915-271518 units CPEP Take 36,000 Units by mouth 3 times daily (with meals)       diphenoxylate-atropine (LOMOTIL) 2.5-0.025 MG tablet Take 1 tablet by mouth 4 times daily as needed       diphenoxylate-atropine (LOMOTIL) 2.5-0.025 MG tablet Take 2 tablets by mouth 4 times daily as needed for diarrhea 56 tablet 1     loperamide (IMODIUM) 2 MG capsule Take 2 mg by mouth 4 times daily as needed       magnesium oxide (MAG-OX) 400 (241.3 Mg) MG tablet Take 400 mg by mouth 2 times daily       omeprazole (PRILOSEC) 20 MG DR capsule Take 20 mg by mouth daily       potassium chloride ER (KLOR-CON M) 20 MEQ CR tablet Take 20 mEq by mouth 2 times daily       prochlorperazine (COMPAZINE) 10 MG tablet Take 10 mg by mouth as needed       umeclidinium-vilanterol (ANORO ELLIPTA) 62.5-25 MCG/INH oral inhaler Inhale 1 puff into the lungs daily       No facility-administered medications prior to visit.     Allergies/Reactions: Fentanyl and related    No orders of the defined types were placed in this encounter.    Social History     Tobacco Use     Smoking status: Former Smoker     Types: Cigarettes     Quit date: 2021     Years since quittin.8     Smokeless tobacco: Never Used   Substance Use Topics     Alcohol use: Yes     Comment: 8 drinks a week/ gin     Drug use: Not Currently     Types: Marijuana     Family History    Problem Relation Age of Onset     Cerebrovascular Disease Father    No cancers in first or second degree relatives.    ROS (score of 0 indicates that symptom is at baseline for most sections below)   See Flowsheet for additional comments as indicated.  Data Unavailable                             Review of Systems   Constitutional: Negative for appetite change (no appetite change/ did change diet for symptom management ).   Respiratory: Negative for shortness of breath.    Gastrointestinal: Positive for diarrhea (multiple loose stools in a day ), nausea and vomiting. Negative for abdominal pain, anal bleeding and blood in stool.     Physical Examination   Vitals: There were no vitals taken for this visit.  Wt Readings from Last 3 Encounters:   10/27/21 52.3 kg (115 lb 6.4 oz)   10/21/21 53.1 kg (117 lb 1.6 oz)   10/19/21 53.2 kg (117 lb 3.2 oz)     Evaluation (during the week, additional physician evaluations at [facility] with the staff)):  KPS: 70 (cares for self but unable to carry on normal activity or do active work).  General: Continue to appear much more well than at consultation. Moving quickly and confidently. Appears clinically stable. Appears in fair general health. Appears thin but not gaunt or wasted. Appears rested. Appears stated age. Appears well-developed, well-nourished, and in no acute distress. Does not appear acutely or chronically ill. Appears well groomed.  Head: No alopecia. Normocephalic.  Eyes: Anicteric.  ENT: No mucositis. Good volume. No hoarseness.  Neck: Trachea midline.  Lymphatics: Deferred.  Chest/Breasts: Right chest wall port site is clean, dry, and intact. No erythema, discharge, or cellulitis. No implanted cardiac device.   Lungs: Easy respirations, no use of accessory muscles. Clear to auscultation.  Cardiovascular: No jugulovenous distension. No carotid pulsations. RRR, S1, S2.  Abdomen: No erythema. Nondistended, nontender. Soft. Bowel sounds positive.  Pelvis: [place]. No  erythema. Nondistended, nontender. Soft. Bowel sounds positive.  MSK: Shoulder range of motion is . No arm edema or hand edema. No cords or calf tenderness.  Integument: No jaundice or pallor. No cellulitis.  Neurologic: Strong, rapid gait. Right-handed. Alert, oriented, fluent. Memory intact. Motor intact. Sensory intact. No ataxia.  Psychologic: Broadly smiling. Chatty. Bright, upbeat, relaxed, confident, engaging, and motivated. Pleasant, cooperative, insightful, and concrete.    Physician Radiation Therapy Impression   Routine tolerance to radiation therapy.    Physician Radiation Therapy Plan   No new interventions. Boost/cone down pending. Reviewed graphical 3D plan with attention to dose to the rectum, small bowel, large bowel, bladder, pelvic bones, etc. Reviewed the large volume of stool and gas in the treatment planning CT, daily cone beam CT, and associated elevated risk of pelvic toxicity from radiation therapy (e.g., nausea, vomiting, cramping, diarrhea, etc) as well as complications after surgery. Reviewed anticipated time course, nature, and potential interventions for managing toxicity during and after radiation therapy. Patient aware of onsite and telemedicine coverage of our clinic. She wished to proceed with treatment. All questions answered to the satisfaction of the patient.    Physician Radiation Therapy Weekly Review   Reviewed available labs and diagnostic studies. Interpreted as adequate to proceed with radiation therapy. Discussed management with Therapy, Treatment Planning, and Nursing. Reviewed weekly routine QA, linac imaging, and clinical set up are adequate to proceed with radiation therapy as prescribed.      Suzi Higuera DNP, APRN, FNP-C  Familia Crews MD, PhD  Department of Radiation Oncology  Tel: [phone]  Fax: [phone]

## 2021-11-04 ENCOUNTER — APPOINTMENT (OUTPATIENT)
Dept: RADIATION ONCOLOGY | Facility: HOSPITAL | Age: 71
End: 2021-11-04
Payer: MEDICARE

## 2021-11-04 ENCOUNTER — RESULTS ONLY (OUTPATIENT)
Dept: RADIATION ONCOLOGY | Facility: HOSPITAL | Age: 71
End: 2021-11-04

## 2021-11-04 LAB
RAD ONC ARIA COURSE ID: NORMAL
RAD ONC ARIA COURSE LAST TREATMENT DATE: NORMAL
RAD ONC ARIA COURSE START DATE: NORMAL
RAD ONC ARIA COURSE TREATMENT ELAPSED DAYS: 31
RAD ONC ARIA FIRST TREATMENT DATE: NORMAL
RAD ONC ARIA PLAN FRACTIONS TREATED TO DATE: 23
RAD ONC ARIA PLAN ID: NORMAL
RAD ONC ARIA PLAN NAME: NORMAL
RAD ONC ARIA PLAN PRESCRIBED DOSE PER FRACTION: 1.8 GY
RAD ONC ARIA PLAN TOTAL FRACTIONS PRESCRIBED: 25
RAD ONC ARIA PLAN TOTAL PRESCRIBED DOSE: 4500 CGY
RAD ONC ARIA REFERENCE POINT DOSAGE GIVEN TO DATE: NORMAL GY
RAD ONC ARIA REFERENCE POINT ID: NORMAL

## 2021-11-04 PROCEDURE — 77386 HC IMRT TREATMENT DELIVERY, COMPLEX: CPT | Performed by: RADIOLOGY

## 2021-11-04 PROCEDURE — 77014 PR CT GUIDE FOR PLACEMENT RADIATION THERAPY FIELDS: CPT | Mod: 26 | Performed by: RADIOLOGY

## 2021-11-05 ENCOUNTER — RESULTS ONLY (OUTPATIENT)
Dept: RADIATION ONCOLOGY | Facility: HOSPITAL | Age: 71
End: 2021-11-05

## 2021-11-05 ENCOUNTER — APPOINTMENT (OUTPATIENT)
Dept: RADIATION ONCOLOGY | Facility: HOSPITAL | Age: 71
End: 2021-11-05
Payer: MEDICARE

## 2021-11-05 LAB
RAD ONC ARIA COURSE ID: NORMAL
RAD ONC ARIA COURSE LAST TREATMENT DATE: NORMAL
RAD ONC ARIA COURSE START DATE: NORMAL
RAD ONC ARIA COURSE TREATMENT ELAPSED DAYS: 32
RAD ONC ARIA FIRST TREATMENT DATE: NORMAL
RAD ONC ARIA PLAN FRACTIONS TREATED TO DATE: 24
RAD ONC ARIA PLAN ID: NORMAL
RAD ONC ARIA PLAN NAME: NORMAL
RAD ONC ARIA PLAN PRESCRIBED DOSE PER FRACTION: 1.8 GY
RAD ONC ARIA PLAN TOTAL FRACTIONS PRESCRIBED: 25
RAD ONC ARIA PLAN TOTAL PRESCRIBED DOSE: 4500 CGY
RAD ONC ARIA REFERENCE POINT DOSAGE GIVEN TO DATE: NORMAL GY
RAD ONC ARIA REFERENCE POINT ID: NORMAL

## 2021-11-05 PROCEDURE — 77014 PR CT GUIDE FOR PLACEMENT RADIATION THERAPY FIELDS: CPT | Mod: 26 | Performed by: RADIOLOGY

## 2021-11-05 PROCEDURE — 77386 HC IMRT TREATMENT DELIVERY, COMPLEX: CPT | Performed by: RADIOLOGY

## 2021-11-09 ENCOUNTER — TELEPHONE (OUTPATIENT)
Dept: RADIATION ONCOLOGY | Facility: HOSPITAL | Age: 71
End: 2021-11-09
Payer: MEDICARE

## 2021-11-09 NOTE — TELEPHONE ENCOUNTER
"Telephone Encounter    Yvette called radiation oncology  with concerns regarding her health.  This writer spoke with Yvette regarding new and worsening complaints.  Yvette explains she was in the emergency department yesterday due to weight loss (down to 97 pounds), and continuing watery diarrhea.  She reportedly was given IV fluids and potassium while in their care, and was sent home with oral potassium to be taken.  She explains this morning she had some almond milk, and had 2 episodes of vomiting this morning when she was trying to come in for treatment.  She went back home to change her clothes, and at this point does not know what to do because she does not feel well enough to ride in the car.  She explains she had a \"terrible\" weekend which consisted of too many bouts of watery diarrhea to count, accompanied with nausea and vomiting.  She explains she is no longer able to keep anything down, cannot eat food, cannot keep any liquids down, and cannot control her diarrhea.  She reports taking 2 Lomotil pills 5-6 times a day, as well as 6-8 Imodium a day.    Patient was encouraged to return to the emergency department for further evaluation.  She was also encouraged to speak with medical oncology regarding medications.  She was educated on Lomotil dose and taking as prescribed.     Patient agrees with plan, she explains she will had into the emergency department.  She states that she will call with an update.    Suzi Higuera DNP, APRN, FNP-C   Department of Radiation Oncology       "

## 2021-11-10 ENCOUNTER — TELEPHONE (OUTPATIENT)
Dept: RADIATION ONCOLOGY | Facility: HOSPITAL | Age: 71
End: 2021-11-10
Payer: MEDICARE

## 2021-11-10 NOTE — TELEPHONE ENCOUNTER
Son, Oli Singletary called mom is still inpatient [facility]. No planned discharge date. Canceling 11/11/21 radiation appointment.    Marjorie Blake

## 2021-11-15 ENCOUNTER — TELEPHONE (OUTPATIENT)
Dept: RADIATION ONCOLOGY | Facility: HOSPITAL | Age: 71
End: 2021-11-15
Payer: MEDICARE

## 2021-11-15 NOTE — TELEPHONE ENCOUNTER
Left message with son to check on Yvette's status and if she will be in for treatment.   KYLE Bentley(T)(T)

## 2021-11-17 ENCOUNTER — TELEPHONE (OUTPATIENT)
Dept: RADIATION ONCOLOGY | Facility: HOSPITAL | Age: 71
End: 2021-11-17
Payer: MEDICARE

## 2021-11-18 ENCOUNTER — TELEPHONE (OUTPATIENT)
Dept: RADIATION ONCOLOGY | Facility: HOSPITAL | Age: 71
End: 2021-11-18
Payer: MEDICARE

## 2021-11-18 NOTE — TELEPHONE ENCOUNTER
Incoming call  Patient health update    Yvette's son Oli called the clinic today to give an update on his mother's health.  Oli explains that she went into the emergency room on Monday of last week due to vomiting & diarrhea. She was admitted for dehydration and thus treated with IV fluids, is getting tube feedings, and treated with antibiotics for a colon infection.  He explains she is up from 98 lbs to 119 lbs and is feeling somewhat better.  He reports the hospitals tentative plan is to keep her inpatient through the weekend and reevaluate Monday morning.  Oli states he will call regardless on Wednesday of next week to give us an update on Yvette's situation.    Suzi Higuera DNP, APRN, FNP-C   Department of Radiation Oncology

## 2021-12-01 ENCOUNTER — TELEPHONE (OUTPATIENT)
Dept: RADIATION ONCOLOGY | Facility: HOSPITAL | Age: 71
End: 2021-12-01
Payer: MEDICARE

## 2021-12-01 NOTE — TELEPHONE ENCOUNTER
"Telephone Encounter     Phone call was made to patients son Neal for an update on patients condition. Neal states that his mother is \"not doing very well\". She was discharged from the hospital, sent home and had continued to have diarrhea. She was readmitted to the hospital- he explains she is having trouble maintaining her potassium and magnesium levels. He reports that she started refusing cares. She was since discharged into a nursing home for strengthening. The family would appreciate if we reach out every 1-2 to re evaluate her condition.     Dr. Crews and Radiation Therapists made aware of patients condition.     Suzi Higuera DNP, APRN, FNP-C   Department of Radiation Oncology       "

## 2021-12-23 ENCOUNTER — TELEPHONE (OUTPATIENT)
Dept: RADIATION ONCOLOGY | Facility: HOSPITAL | Age: 71
End: 2021-12-23
Payer: MEDICARE

## 2022-02-28 ENCOUNTER — TELEPHONE (OUTPATIENT)
Dept: RADIATION ONCOLOGY | Facility: HOSPITAL | Age: 72
End: 2022-02-28
Payer: MEDICARE

## 2022-02-28 NOTE — TELEPHONE ENCOUNTER
Telephone Encounter     Telephone call made to patient regarding completion of her current radiation therapy plan.  Patient previously had been hospitalized and discharged to nursing home with an unknown discharge date.  Follow-up today regarding patient's status completed.  Yvette reports she was discharged home 02/04/2022.  Overall reports feeling much better.  She states that she has a oncology appointment with Dr. Brothers this afternoon.  She is unsure if this time she wants to finish her current radiation therapy plan.  She states this will depend on her conversation today with Dr. Brothers.  She does state she thinks she needs a new lung scan, and remembers possibly needing radiation therapy to her lung.  She plans to update us after she speaks with Dr. Brothers.  If we do not hear from her today, we will follow-up with her tomorrow.    Suzi Higuera DNP, APRN, FNP-C   Department of Radiation Oncology

## 2022-03-01 ENCOUNTER — TELEPHONE (OUTPATIENT)
Dept: RADIATION ONCOLOGY | Facility: HOSPITAL | Age: 72
End: 2022-03-01
Payer: MEDICARE

## 2022-03-01 NOTE — TELEPHONE ENCOUNTER
Telephone Encounter     Follow-up telephone encounter completed regarding patient's future plans in continuing or canceling the last 4 fractions of her pelvic radiation therapy.  This decision was pending visit with her medical oncologist whom she met with on 02/28/2022.  Yvette explains her discussion with Dr. Brothers included moving to surgical treatment of her rectal tumor, rather than completion of radiation therapy.  Yvette is agreeable to this plan, and is pending surgery April 12, 2022.  She does plan to return for consultation regarding a previously noted lung lesion post surgery and healing process. Yvette states she will call us with any new information, and plans to return in the next 2-3 months. All questions answered to patients satisfaction, she denies any further questions or concerns at this time.     Suzi Higuera DNP, APRN, FNP-C   Department of Radiation Oncology

## 2022-04-20 ENCOUNTER — TELEPHONE (OUTPATIENT)
Dept: RADIATION ONCOLOGY | Facility: HOSPITAL | Age: 72
End: 2022-04-20
Payer: MEDICARE

## 2022-04-20 NOTE — TELEPHONE ENCOUNTER
"Follow-up telephone call placed to patient.  Patient had stopped radiation therapy due to side effects, hospitalization, and now recently had an ileostomy placed 2022.  She reports she is feeling very sore, \"this is worse than my \".  Overall she is reportedly doing well and is able to ambulate around her home.  She has a plan to see Dr. Brothers on May 17, 2022, she asked that we give her a call that afternoon or the following day to schedule radiation therapy to her lung, that has been put on hold.    "

## 2022-05-17 ENCOUNTER — TELEPHONE (OUTPATIENT)
Dept: RADIATION ONCOLOGY | Facility: HOSPITAL | Age: 72
End: 2022-05-17
Payer: MEDICARE

## 2022-05-17 NOTE — TELEPHONE ENCOUNTER
Patient called with request to move forward with her lung treatment that has been pending since her last radiation therapy treatment to the rectum. She unfortunately did not finish radiation therapy to the rectum due to complications. She however is healthy enough now and would like to have her lung radiated as previously planned. I will call her back by the end of this week to get her scheduled for either a follow up or a consult.

## 2022-05-18 ENCOUNTER — DOCUMENTATION ONLY (OUTPATIENT)
Dept: RADIATION ONCOLOGY | Facility: HOSPITAL | Age: 72
End: 2022-05-18
Payer: MEDICARE

## 2022-05-18 NOTE — PROGRESS NOTES
"  Radiation Oncology - Clinic Note (1:25 PM)      Via [employer], I telephoned the patient and her status. She reported that she is, \"Doing great!\". She will have a chest, abdomen, and pelvis CT tomorrow at [facility] as arranged by medical oncology. She reports that the colorectal surgeon plan is to reverse her diverting ileostomy approximately 4-6-week after lung SBRT, assuming that the procedure is performed. To optimize coordination of care, I recommended that we cancel her clinic visit with me the day after tomorrow. So that I could provide an better informed opinion, I explained that I prefer to have the opportunity to evaluate the pending images as well as her recent medical events prior to seeing her in clinic. If lung SBRTappears indicated, I suggested that we schedule a follow-up visit followed immediately by CT-based simulation if lung SBRT is still recommended. She wished to proceed as recommended. I answered all questions to her satisfaction.      Familia Crews M.D., Ph.D.  Department of Radiation Oncology  Tel: [phone]  Fax: [phone]  "

## 2022-05-19 ENCOUNTER — TELEPHONE (OUTPATIENT)
Dept: RADIATION ONCOLOGY | Facility: HOSPITAL | Age: 72
End: 2022-05-19
Payer: MEDICARE

## 2022-05-20 ENCOUNTER — DOCUMENTATION ONLY (OUTPATIENT)
Dept: RADIATION ONCOLOGY | Facility: HOSPITAL | Age: 72
End: 2022-05-20
Payer: MEDICARE

## 2022-05-20 ENCOUNTER — TELEPHONE (OUTPATIENT)
Dept: RADIATION ONCOLOGY | Facility: HOSPITAL | Age: 72
End: 2022-05-20
Payer: MEDICARE

## 2022-05-20 RX ORDER — DIBASIC SODIUM PHOSPHATE, MONOBASIC POTASSIUM PHOSPHATE AND MONOBASIC SODIUM PHOSPHATE 852; 155; 130 MG/1; MG/1; MG/1
TABLET ORAL
COMMUNITY
Start: 2022-04-25 | End: 2022-05-23

## 2022-05-20 RX ORDER — ANTACID TABLETS 500 MG/1
500 TABLET, CHEWABLE ORAL PRN
COMMUNITY

## 2022-05-20 RX ORDER — PREDNISONE 20 MG/1
TABLET ORAL
COMMUNITY
Start: 2022-03-15 | End: 2022-05-23

## 2022-05-20 RX ORDER — BACILLUS COAGULANS 1B CELL
CAPSULE ORAL
COMMUNITY

## 2022-05-20 RX ORDER — AZITHROMYCIN 250 MG/1
TABLET, FILM COATED ORAL
COMMUNITY
Start: 2022-03-15 | End: 2022-05-23

## 2022-05-20 RX ORDER — OXYCODONE HYDROCHLORIDE 5 MG/1
TABLET ORAL
COMMUNITY
Start: 2022-04-22 | End: 2022-05-20

## 2022-05-20 RX ORDER — METRONIDAZOLE 500 MG/1
TABLET ORAL
COMMUNITY
Start: 2022-03-25 | End: 2022-05-20

## 2022-05-20 RX ORDER — AMLODIPINE BESYLATE 5 MG/1
TABLET ORAL
COMMUNITY
Start: 2022-04-06 | End: 2022-05-20

## 2022-05-20 RX ORDER — ACETAMINOPHEN 325 MG/1
650 TABLET ORAL
COMMUNITY
Start: 2021-12-01 | End: 2022-05-20

## 2022-05-20 RX ORDER — LOPERAMIDE HCL 2 MG
2 CAPSULE ORAL
COMMUNITY
Start: 2021-12-01 | End: 2022-05-20

## 2022-05-20 RX ORDER — OXYCODONE HYDROCHLORIDE 5 MG/1
5 TABLET ORAL
COMMUNITY
Start: 2022-04-22 | End: 2022-05-23

## 2022-05-20 RX ORDER — AMLODIPINE BESYLATE 5 MG/1
5 TABLET ORAL
COMMUNITY
Start: 2022-04-06

## 2022-05-20 RX ORDER — ENOXAPARIN SODIUM 100 MG/ML
INJECTION SUBCUTANEOUS
COMMUNITY
Start: 2022-04-18 | End: 2022-05-23

## 2022-05-20 RX ORDER — NEOMYCIN SULFATE 500 MG/1
TABLET ORAL
COMMUNITY
Start: 2022-03-25 | End: 2022-05-23

## 2022-05-20 RX ORDER — MORPHINE 10 MG/ML
TINCTURE ORAL
COMMUNITY
Start: 2022-02-21 | End: 2022-05-23

## 2022-05-20 NOTE — PROGRESS NOTES
Radiation Oncology - Clinic Note      Formally reviewed yesterday's chest, abdomen, pelvis CT images in PACS and after additional digital processing/analysis in our computerized treatment planning system. Right middle lobe mass slightly larger. Images not clinically suspicious for recurrent or metastatic rectal cancer. Therefore recommend proceeding with arrangements for SBRT for definitive treatment of the right middle lobe squamous cell carcinoma. Staff will contact the patient to to update her and to schedule same-day follow-up and simulation for lung SBRT.      Familia Crews M.D., Ph.D.  Department of Radiation Oncology  Tel: [phone]  Fax: [phone]

## 2022-05-20 NOTE — TELEPHONE ENCOUNTER
"  Radiation Oncology - Telephone Call (8:15 pm)      Via [employer], spoke to the patient's son since she was not available. Reported that the patient is doing \"way way much better\" following recovery from pelvic concurrent chemoradiation, low anterior resection, and perioperative challenges. The patient has improved to the point that she is enjoying socializing with friends and family. She is eagerly looking forward to reversal of the ileostomy, assuming that she is able to receive SBRT for the right lung cancer. She continues to use supplemental oxygen. We reviewed the preliminary CT findings from today's study. Based on the results, I tentatively recommended that we proceed with same-day follow-up visit and CT-based simulation for SBRT of the enlarging right middle lobe lung lesion. He wished to proceed as recommended. We will therefore contact the patient for the clinic visit and simulation, assuming that additional evaluation of the patient's recent CT and review of the patient's clinical information supports lung SBRT. I answered all questions to his satisfaction.      Familia Crews M.D., Ph.D.  Department of Radiation Oncology  Tel: [phone]  Fax: [phone]  "

## 2022-05-20 NOTE — TELEPHONE ENCOUNTER
Called pt 10:50am to schedule 30-45min follow-up with Dr. Crews for Monday 5/23/22 9:45am, with simulation to follow.  Briefly discussed the breath hold. Pt had no further questions.  Pito TOBIAS)

## 2022-05-20 NOTE — PROGRESS NOTES
Radiation Oncology - Telephone Call (8:35 AM)      Patient returned my call from last night. She was bright, upbeat, and engaging had spoken to her son. Reviewed my discussion and recommendations as communicated to her son last night. Notified her that the [facility] is having technical challenges in sending yesterday CT images to me for review. Stated that I could not make a final decision about the merits of SBRT until the images are reviewed. She agreed that coming today for a clinic visit was not preferable, especially since the staff can not accommodate a simulation today. We briefly reviewed our prior discussions about SBRT (the patient had excellent recollection). Since she has not been practicing deep inspiration breath-hold, she agreed to do so in preparation for the anticipated simulation. I therefore recommended that I continue as previously commended with formal image review of yesterday's body CT. If SBRT continues to be recommended, we will contact her for a appointment next week for a follow-up (approximately 30-45 minutes) followed immediately by CT-a simulation for lung SBRT with breath-hold. The patient understands that I will participate next week via telemedicine (the patient recall that we have previously done such visits in clinic). She wished proceed as recommended. I answered all questions to her satisfaction.      Familia Crews M.D., Ph.D.  Department of Radiation Oncology  Tel: [phone]  Fax: [phone]

## 2022-05-23 ENCOUNTER — ALLIED HEALTH/NURSE VISIT (OUTPATIENT)
Dept: RADIATION ONCOLOGY | Facility: HOSPITAL | Age: 72
End: 2022-05-23
Attending: RADIOLOGY
Payer: MEDICARE

## 2022-05-23 VITALS — WEIGHT: 108.5 LBS | BODY MASS INDEX: 17.78 KG/M2

## 2022-05-23 DIAGNOSIS — C34.2 PRIMARY MALIGNANT NEOPLASM OF RIGHT MIDDLE LOBE OF LUNG (H): Primary | ICD-10-CM

## 2022-05-23 DIAGNOSIS — C20 MALIGNANT NEOPLASM OF RECTUM (H): ICD-10-CM

## 2022-05-23 PROCEDURE — 77263 THER RADIOLOGY TX PLNG CPLX: CPT | Performed by: RADIOLOGY

## 2022-05-23 PROCEDURE — G0463 HOSPITAL OUTPT CLINIC VISIT: HCPCS | Performed by: RADIOLOGY

## 2022-05-23 PROCEDURE — G0463 HOSPITAL OUTPT CLINIC VISIT: HCPCS | Mod: 25 | Performed by: RADIOLOGY

## 2022-05-23 PROCEDURE — 77334 RADIATION TREATMENT AID(S): CPT | Performed by: RADIOLOGY

## 2022-05-23 PROCEDURE — 77334 RADIATION TREATMENT AID(S): CPT | Mod: 26 | Performed by: RADIOLOGY

## 2022-05-23 PROCEDURE — 77470 SPECIAL RADIATION TREATMENT: CPT | Mod: 26 | Performed by: RADIOLOGY

## 2022-05-23 ASSESSMENT — PAIN SCALES - GENERAL: PAINLEVEL: NO PAIN (0)

## 2022-05-23 ASSESSMENT — PATIENT HEALTH QUESTIONNAIRE - PHQ9: SUM OF ALL RESPONSES TO PHQ QUESTIONS 1-9: 0

## 2022-05-23 NOTE — PROGRESS NOTES
Patient simulation completed today for Yvette Singletary 05/23/22.    Pito Cardenas  May 23, 2022  11:52 AM

## 2022-05-23 NOTE — PROGRESS NOTES
[facility]  DEPARTMENT OF RADIATION ONCOLOGY   SIMULATION NOTE    Name: Yvette Singletary MRN: [id number]   : 1950 (71 year old)  Date of Service: May 23, 2022        Diagnosis and Cancer Staging  Primary malignant neoplasm of right middle lobe of lung (H)  Staging form: Lung, AJCC 8th Edition  - Clinical stage from 2021: Stage IA3 (cT1c, cN0, cM0) - Signed by Familia Crews MD on 10/19/2021    Malignant neoplasm of rectum (H)  Staging form: Colon and Rectum, AJCC 8th Edition  - Clinical stage from 2021: Stage IIA (cT3, cN0, cM0) - Signed by Familia Crews MD on 2021  - Pathologic stage from 2022: Stage IIA (ypT3, pN0, cM0) - Signed by Familia Crews MD on 2022      Procedure   For frameless SBRT, the  patient comes to clinic for simulation and radiation therapy for treatment as specified on the written consent (site of treatment, type of cancer). Therapy, Treatment Planning, and I reviewed the anticipated radiation therapy, clinical history and documentation, and radiographic information and images. We verified written consent for treatment. With the patient, we verified her identification, site, and side of treatment. We evaluated multiple setup positions and elected to simulate the patient in a modified supine position. We used orthogonal lasers to align them with the CT simulator. We immobilized the patient with a customized torso mold and accessories to improve the reproducibility and safety for daily radiation therapy. We placed radiopaque markers to assist in identifying topographical landmarks for simulation. We obtained  and axial CT imaging through the target region. We used virtual simulation techniques to verify the adequacy of the CT images and to create a preliminary setup isocenter. Motion management of respiratory and cardiac motion utilized 4D-CT and/or deep inspiration breath hold (DIBH) through the anticipated field of radiation therapy. We placed  tattoos to casa the setup isocenter. The patient tolerated the procedure well and without complications. We will use available diagnostic and radiation therapy imaging studies for CT-based treatment planning with image fusion as indicated. I anticipate utilizing a form of intensity-modulated or 3D-conformal radiation therapy to develop a computerized treatment plan whose dosimetric analysis (e.g., dose-volume histogram (DVH)) indicates adequate coverage of target tissues and sparing of nearby normal structures. We will complete routine QA procedures. The patient wished to proceed as recommended. We answered all questions to her satisfaction.      Familia Crews M.D., Ph.D.  Department of Radiation Oncology  Tel: [phone]  Fax: [phone]

## 2022-05-23 NOTE — PROGRESS NOTES
[facility]  DEPARTMENT OF RADIATION ONCOLOGY  FOLLOW-UP CONSULTATION NOTE (Video Virtual Visit)    Name: Yvette Singletary MRN: [id number]   : 1950 (71 year old)  Date of Service: May 23, 2022 Referring: Dr. Brothers, III       Diagnosis and Cancer Staging  Primary malignant neoplasm of right middle lobe of lung (H)  Staging form: Lung, AJCC 8th Edition  - Clinical stage from 2021: Stage IA3 (cT1c, cN0, cM0) - Signed by Familia Crews MD on 10/19/2021    Malignant neoplasm of rectum (H)    Staging form: Colon and Rectum, AJCC 8th Edition    - Clinical stage from 2021: Stage IIA (cT3, cN0, cM0) - Signed by Familia Crews MD on 2021    - Pathologic stage from 2022: Stage IIA (ypT3, pN0, cM0) - Signed by Familia Crews MD on 2022      Recent History (CE)   The patient returns to clinic in preparation for simulation and radiation therapy for definitive non-surgical treatment of her right middle lobe lung cancer. Her interval history since her most recent clinic visit on 2022 is summarized below. The patient felt well and offered no new complaints. She reported that her respiratory status has continued to improve. While at her subacute facility, the physical therapy team worked diligently with her to improve her respiratory function. The rehabilitation included progressively longer periods of physical activity without supplemental oxygen. The patient reported that she has not used oxygen for the last 5 days. She reported that oxygen saturations briefly fall into the 80's with physical activity and then quickly return to the 90's after stopping the activity. She has worked with medical oncology on her extended medical team and simplifying her medications up (currently only takes 4). Her pulmonary regimen now routinely consists only of an inhaler. She is very proud of her rapid recent improvement of functional independence and physical/social activities. The patient denied  cough, hemoptysis, pleuritic pain, or chest wall pain. She reported changing the ostomy bag approximately 4-5 times per day. She eats a broad, regular diet. She reports a single episode of passage of a mild amount of mucus. In the context of national guidelines such as the NCCN, we reviewed the potentially beneficial role of radiation therapy as definitive treatment of her enlarging lung cancer (we reviewed the diagnostic images). The patient again expressed her desire to undergo as soon as possible a reversal of the diverting ileostomy. She again confirmed that the colorectal surgeon recommended a 4-6 week interval from the end of lung SBRT to the abdominal surgery to permit adequate time for soft tissues to recover.    Past Medical History:   Diagnosis Date     BMI 25.0-25.9,adult 2020     Chemotherapy-induced enteritis 2021     Cigarette smoker 10/24/2002     COPD (chronic obstructive pulmonary disease) (H)      Diarrhea, unspecified type 2021     Essential hypertension 2016     History of blood transfusion      Hypokalemia 10/25/2021    resolved     Hypomagnesemia 10/25/2021    resolved     Hyponatremia 2018    Acute on chronic hyponatremia     Hypophosphatemia 2021     Idiopathic chronic pancreatitis (H) 2018     Intestinal infection due to Plesiomonas shigelloides 2021     Lung cancer (H) 2021     Malignant neoplasm of lower lobe of right lung (H) 2021     Normocytic anemia 2021     Pressure injury of sacral region, stage 2 (H) 2021     Radiation enteritis 2021     Rectal cancer (H) 2021     Severe protein-calorie malnutrition (H) 2021     Ventral hernia without obstruction or gangrene 2022     Past Surgical History:   Procedure Laterality Date     AS  DELIVERY ONLY       AS LIGATE FALLOPIAN TUBE  1986    Tubal Ligation     BIOPSY Right 2021    CT-guided core needle biopsy, right lung     COLONOSCOPY   06/04/2021     DILATION AND CURETTAGE       ENDOSCOPIC RETROGRADE CHOLANGIOPANCREATOGRAPHY  05/16/2018    Procedure: possible endoscopic retrograde chlangiopancreatography;  Surgeon: Jamarcus Haney MD;  Location: [facility] MAIN [facility]     FLEXIBLE SIGMOIDOSCOPY  06/2021     HC US TRANSRECTAL  06/2021    rectal ultrasound.     hip replacements Bilateral      ILEOSTOMY  04/14/2022     OTHER SURGICAL HISTORY  05/16/2018    Procedure: endoscopic ultrasonography;  Surgeon: Jamarcus Haney MD;  Location: [facility]     OTHER SURGICAL HISTORY  05/31/2018    Procedure: modified pancreatoduodenectomy with lateral pancreatojejunostomy, biposy liver segment three;  Surgeon: Ren     OTHER SURGICAL HISTORY  11/05/2018    Procedure: EXAM UNDER ANESTHESIA GASTROENTEROLOGY;  Surgeon: Danial [place] MD;  Location: [facility] ENDOSCOPY 1ST      TOTAL HIP ARTHROPLASTY Left 07/13/2016    Procedure: left total hip arthroplasty;  Surgeon: SHUKRI Lamb MD;  Location: [facility]     UPPER GASTROINTESTINAL ENDOSCOPY  05/16/2018    Procedure: esophagogastroduodenoscopy;  Surgeon: Jamarcus Haney MD;  Location: [facility] MAIN [facility]     UPPER GASTROINTESTINAL ENDOSCOPY  10/29/2018    Procedure: ESOPHAGOGASTRODUODENOSCOPY DIAGNOSTIC;  Surgeon: Danial [place] MD;  Location: [facility] ENDOSCOPY 1ST      UPPER GASTROINTESTINAL ENDOSCOPY  11/05/2018    ESOPHAGOGASTRODUODENOSCOPY DIAGNOSTIC;  Surgeon: Danial [place] MD;  Location: [facility] ENDOSCOPY 1ST      WHIPPLE PROCEDURE  05/31/2018     [facility] TOTAL HIP ARTHROPLASTY Right 02/16/1999    Hip Replacement, Total, right hip     Outpatient Encounter Medications as of 5/23/2022   Medication Sig Dispense Refill     amLODIPine (NORVASC) 5 MG tablet Take 5 mg by mouth       amylase-lipase-protease (CREON 36) [id number] units CPEP Take 36,000 Units by mouth 3 times daily (with meals)       calcium carbonate 500 MG CHEW Take 500 mg by mouth as needed Chew and swallow 500 mg as needed. Chew thoroughly.        ferrous fumarate 65 mg, [place]. FE,-Vitamin C 125 mg (VITRON-C)  MG TABS tablet        omeprazole (PRILOSEC) 20 MG DR capsule Take 20 mg by mouth daily       TRELEGY ELLIPTA 200-62.5-25 MCG/INH oral inhaler        vitamin D3 (CHOLECALCIFEROL) 125 MCG (5000 UT) tablet Take 5,000 Units by mouth       acetaminophen (TYLENOL) 325 MG tablet Take 325-650 mg by mouth every 6 hours as needed (Patient not taking: No sig reported)       loperamide (IMODIUM) 2 MG capsule Take 2 mg by mouth 4 times daily as needed (Patient not taking: Reported on 5/23/2022)       prochlorperazine (COMPAZINE) 10 MG tablet Take 10 mg by mouth as needed (Patient not taking: Reported on 5/23/2022)       [DISCONTINUED] acetaminophen (TYLENOL) 325 MG tablet Take 650 mg by mouth       [DISCONTINUED] amLODIPine (NORVASC) 5 MG tablet        [DISCONTINUED] azithromycin (ZITHROMAX) 250 MG tablet        [DISCONTINUED] diphenoxylate-atropine (LOMOTIL) 2.5-0.025 MG tablet Take 1 tablet by mouth 4 times daily as needed       [DISCONTINUED] enoxaparin ANTICOAGULANT (LOVENOX) 40 MG/0.4ML syringe        [DISCONTINUED] Fluticasone-Umeclidin-Vilanterol (TRELEGY ELLIPTA) 200-62.5-25 MCG/INH oral inhaler Inhale 1 puff into the lungs       [DISCONTINUED] loperamide (IMODIUM) 2 MG capsule Take 2 mg by mouth       [DISCONTINUED] magnesium oxide (MAG-OX) 400 (241.3 Mg) MG tablet Take 400 mg by mouth 2 times daily       [DISCONTINUED] metroNIDAZOLE (FLAGYL) 500 MG tablet        [DISCONTINUED] neomycin (MYCIFRADIN) 500 MG tablet        [DISCONTINUED] omeprazole (PRILOSEC) 20 MG DR capsule Take 20 mg by mouth       [DISCONTINUED] opium tincture 10 MG/ML (1%) liquid        [DISCONTINUED] oxyCODONE (ROXICODONE) 5 MG tablet Take 5 mg by mouth       [DISCONTINUED] oxyCODONE (ROXICODONE) 5 MG tablet        [DISCONTINUED] PHOSPHORUS TABLET 250  MG per tablet        [DISCONTINUED] potassium chloride ER (KLOR-CON M) 20 MEQ CR tablet Take 20 mEq by mouth 2 times daily    "    [DISCONTINUED] predniSONE (DELTASONE) 20 MG tablet        [DISCONTINUED] umeclidinium-vilanterol (ANORO ELLIPTA) 62.5-25 MCG/INH oral inhaler Inhale 1 puff into the lungs daily       No facility-administered encounter medications on file as of 2022.     Allergies/Reactions: Fentanyl and related    No orders of the defined types were placed in this encounter.    Social History     Social History Narrative    [place] [address] [place]    Quit smoking \"cold turkey\"    Enjoys Gardening    3 children(1 set of twins)     Socioeconomic History     Marital status: Single     Social History     Tobacco Use     Smoking status: Former Smoker     Types: Cigarettes     Quit date: 2021     Years since quittin.3     Smokeless tobacco: Never Used   Vaping Use     Vaping Use: Never used   Substance Use Topics     Alcohol use: Yes     Comment: 8 drinks a week/ gin     Drug use: Not Currently     Types: Marijuana     Comment: Has not used marijuana in years-2022     Family History   Problem Relation Age of Onset     Cerebrovascular Disease Father    No other cancers in first or second degree relatives.    Baseline Review of Systems (prior to radiation therapy; supplemental to the History)   ROS (score of 0 indicates that symptom is at baseline for most sections below): See Flowsheet for additional comments as indicated.  No Pain (0)                               She denies headache, nausea, vomiting, visual changes, new back pain, or urinary changes. She denies fatigue or malaise. She is much more easily functionally independent than prior to chemoradiation for her rectal cancer. She currently enjoys her very high quality of life.    Physical Exam   KPS: 70 (cares for self but unable to carry on normal activity or do active work).  ECOG Status: 2 (Ambulatory and capable of all selfcare but unable to carry out any work activities; may need help with IADLs up and about > 50% of waking hours)  Vitals: Wt " 49.2 kg (108 lb 8 oz)   BMI 17.78 kg/m    Wt Readings from Last 3 Encounters:   05/23/22 49.2 kg (108 lb 8 oz)   10/27/21 52.3 kg (115 lb 6.4 oz)   10/21/21 53.1 kg (117 lb 1.6 oz)     Clinical Examination: Virtual examination asisted by patient and LPN.  General: Appears upbeat, bright, dynamic, engaging, and well. Does not appear excessively thin gaunt. Appears clinically/medically stable. Appears rested. Appears stated age. Appears well-developed, well-nourished, and in no acute distress. Does not appear acutely or chronically ill. Appears well groomed.  Head: No alopecia. Normocephalic.  Eyes: No glasses. Anicteric, vision intact.  ENT: Good volume. No hoarseness.  Neck: Trachea midline.  Lymphatics: Deferred.  Chest/Breasts: Left chest wall port. No implanted cardiac device.  Lungs: No oxygen. No delay of phases.  Easy respirations, no use of accessory muscles  Cardiovascular: No jugulovenous distension. No carotid pulsations.  Abdomen: Right abdominal wall diverting ileostomy site is pink, clean, dry, intact. Hygiene appears excellent. No cellulitis. Small amount of brown stool in the bag. No erythema. Nondistended, nontender.  Pelvis: No erythema. Nondistended, nontender.  MSK: Shoulder range of motion is good. No arm edema or hand edema. Good muscle tone. No tenderness on palpation.  Integument: No jaundice or pallor.   Neurologic: Right-handed. Alert, oriented, fluent. Memory intact. Motor intact. Sensory intact. No ataxia.  Psychologic: Well-spoken about her cancer and therapy. Good dynamic with her family. Motivated. Pleasant, cooperative, insightful, concrete, and good judgment.    Physician Time on Day of Encounter, and MDM Data Reviewed and Analyzed on Day of Encounter   Time spent on patient activities is based on Chart review, Visit, and Documentation. Total time: 90 minutes.    MDM based on:       High risk element:  o Lung cancer. Pending lung SBRT. Treated locoregionally-advanced rectal  cancer..       Tests, documents, or independent historian(s) analyses include but are not limited to:  o Those referenced above in the HPI.       Independent Interpretations of tests include but are not limited to:  o Those referenced above in the HPI.       Discussion with the medical team about management or test interpretation include but are not limited to:  o Reviewed with medical oncology arrangements for coordination of care.    Physician Information Review   Video telemedicine visit from my off-site office to the our clinic with the patient via [employer] manuel (10:12-10:28 AM). I reviewed the case with the patient, evaluated her, and discussed her treatment options and risks of therapy. I reviewed the medical records, radiographic information, and pathologic studies. I reviewed the imaging studies via PACS and with the patient. I reviewed our intake sheets. The patient is a good historian, began the visit with good insights into the disease process, and acknowledged the potentially poor consequences of her disease. She educated herself through a variety of educational media including the Internet. She demonstrated good comprehension of our discussion and explored the treatment options with good understanding. The patient asked pertinent questions and insightful follow-up questions. I illustrated the anatomy and field of treatment. We discussed the onsite and telemedicine coverage of our clinic. I offered to speak with her family members and friends today by speakerphone. The patient declined my offer but granted me permission to speak with them if they contact me or come to clinic. She previously declined referral for an additional opinion or conservative care. She previously accepted referral to our social work staff for additional resources including potential counseling. The patient granted me permission to exchange medical information and records with other healthcare professionals. No therapeutic  radiation protocols for the patient's disease are available at our Center.. The patient was ready to learn and demonstrated no learning barriers. Her learning preferences included listening. She was given ample opportunity to ask questions, and all questions were answered to her satisfaction. We reviewed educational materials including Internet resources and provided the patient with written materials.    We discussed the patient's diagnosis of a radiographically enlarging cancer of the right lung that is histologically distinct from her previously treated rectal cancer. We reviewed the regional anatomy, stage designation, and risk-group. We reviewed patterns of failure after various local and systemic treatments as well as the enlargement of the cancer while on cytotoxic chemotherapy for rectal cancer. We reviewed her good prognosis with single-imodality treatment. We discussed factors specific to her disease including her chronically compromised pulmonary status, pending abdominal surgery, personal circumstances, comorbidities, and other factors potentially impacting the risk of toxicity and clinical outcomes. We reviewed the concept of multimodality coordinated oncologic care and the relative contribution of each to the paradigm of treating her lung cancer. We discussed the use of radiation therapy  as the principal therapeutic modality and omission of surgery for upfront local therapy. We discussed limitations of radiographic imaging in identifying the extent of disease. We discussed the potentially beneficial role of radiation therapy in the context of evolving standards and national guidelines of oncologic care such as the NCCN, ACR, and CARYN as well as management during the COVID-19 pandemic.    We discussed the concept external beam radiation therapy via stereotactic body radiotherapy (SBRT) vs. conventionally delivered, non-stereotactic techniques. We discussed the nature of external beam radiation therapy  from a [employer] True[employer]am linac, concept of CT-based simulation, role of computerized treatment planning, and potential interventions to reduce the toxicity of radiation while improving the efficacy of treatment for her lung cancer. We would likely use image-guidance of either 3D or VMAT beam arrangements. Together, these techniques permit good coverage of complex target tissues (primary site) while maintaining adequate sparing of normal critical structures (chest wall, ribs, lung, heart, spinal cord, etc.). For motion management, we would use 4D-CT simulation and perhaps deep-inspiration breath hold (DIBH) for incorporation of respiratory motion control to assess the impact of pulmonary and cardiac movement on radiation therapy. I would not use protons, TomoTherapy, brachytherapy, or radioprotectant agents. I do not feel that these methods offer a clear benefit for this patient.    We discussed the relative risks, benefits, rationale, potential side effects, alternatives, and adjuncts to radiation therapy for the right lateral lung cancer. Toxicities can be acute or chronic, and can negatively impact long-term quality of life. They can require high levels of supportive care and breaks in radiation therapy. I anticipate at least a mild degree of acute chest wall or skin  toxicity. Among the factors will increase the risk of toxicity are the co-morbidities and circumstances noted above. The toxicities include but are not limited to the     mucositis (upper aerodigestive tract pain, dysphagia, odynophagia, impaired nutrition, bleeding, etc.),   salivary apparatus (excessive dominance of thick mucoid secretions, xerostomia, etc.),   larynx (pain, hoarseness, aspiration, etc.),   thyroid (hypothyroidism, parathyroid dysfunction),     chest wall (soft tissue and bone necrosis, etc.), skin (poor cosmesis, erythema, hyperpigmentation, desquamation, breakdown, fibrosis, pruritus, etc.), lung (dyspnea, oxygen dependence, cough,  pneumonitis, pleuritis, pleural effusion, etc.), heart (infarction, pericarditis, pericardial effusion, etc.), esophagus (pain, odynophagia, dysmotility, perforation, fistula, bleeding, etc.), primary airways (bronchial necrosis, fistula, obstruction, etc.), great vessels (catastrophic hemorrhage, aneurysm, obstruction, etc.), nerves (pain, radiculopathy, paresthesia, weakness, etc.), vasculature (hemorrhage, fistula, obstruction, etc.), soft tissue necrosis, bone necrosis, pain, infection, and other organs as well as systemic toxicities (e.g., fatigue) and long-term risks such as second malignancy.     Physician Radiation Therapy Assessment/Plan   I concur with the multidisciplinary recommendation of proceeding with lung SBRT as definitive treatment of the enlarging right lung mass. Approximately 4-6 weeks afterwards, colorectal surgery is expected to arrange for reversal of the temporary ileostomy. The patient understands that she has an increased risk of toxicity from radiation therapy due to her compromised pulmonary status, radiographic tethering of the disease to the lateral chest wall, prior chemotherapy, pending surgery, and other factors and comorbidities. She wished to proceed with lung SBRT. We therefore obtained written consent. We will proceed with delayed telemetry scheduled simulation. I anticipate treating the patient in either 3-5 fractions of SBRT or perhaps 1 fraction of SBRT. I will notify the surgical teams of my recommendation so that they can coordinate their care of the patient's rectal cancer and lung cancer. We answered all questions to her satisfaction. Thanks allowing us to participate in the care of this very pleasant      Familia Crews MD, PhD  Department of Radiation Oncology  Tel: [phone]  Fax: [phone]    Care Team:  Viraj Brothers III, M.D. (medical oncology)  Marina Haines M.D. (colorectal surgery)  Beverley Laurent M.D. (medicine)

## 2022-05-23 NOTE — PROGRESS NOTES
Patient was assessed using the NCCN psychosocial distress thermometer. Patient rated the score as a zero. Patient rated current stressors as breathing(short of breath with activity). Stressors will be brought to the attention of provider or Oncology RN Care Coordinator for a score of 6 or greater or per nurses discretion.

## 2022-05-24 ENCOUNTER — TELEPHONE (OUTPATIENT)
Dept: RADIATION ONCOLOGY | Facility: HOSPITAL | Age: 72
End: 2022-05-24
Payer: MEDICARE

## 2022-05-24 NOTE — TELEPHONE ENCOUNTER
Spoke with patient to schedule her 5 SBRT appts, starting next week Wed 6/1/22 at 1:30pm.  iPto TOBIAS)

## 2022-05-25 PROCEDURE — 77370 RADIATION PHYSICS CONSULT: CPT | Performed by: RADIOLOGY

## 2022-05-25 PROCEDURE — 77338 DESIGN MLC DEVICE FOR IMRT: CPT | Mod: 26 | Performed by: RADIOLOGY

## 2022-05-25 PROCEDURE — 77301 RADIOTHERAPY DOSE PLAN IMRT: CPT

## 2022-05-25 PROCEDURE — 77293 RESPIRATOR MOTION MGMT SIMUL: CPT

## 2022-05-25 PROCEDURE — 77300 RADIATION THERAPY DOSE PLAN: CPT

## 2022-05-25 PROCEDURE — 77301 RADIOTHERAPY DOSE PLAN IMRT: CPT | Mod: 26 | Performed by: RADIOLOGY

## 2022-05-25 PROCEDURE — 77300 RADIATION THERAPY DOSE PLAN: CPT | Mod: 26 | Performed by: RADIOLOGY

## 2022-05-25 PROCEDURE — 77293 RESPIRATOR MOTION MGMT SIMUL: CPT | Mod: 26 | Performed by: RADIOLOGY

## 2022-05-25 PROCEDURE — 77338 DESIGN MLC DEVICE FOR IMRT: CPT

## 2022-05-27 ENCOUNTER — TELEPHONE (OUTPATIENT)
Dept: RADIATION ONCOLOGY | Facility: HOSPITAL | Age: 72
End: 2022-05-27
Payer: MEDICARE

## 2022-05-27 NOTE — TELEPHONE ENCOUNTER
Patient returned call to reschedule radiation therapy appointments from June 1st and June 2nd. Appointments were rescheduled to a later time on June 2nd and June 3rd. Patient was in amenable to the scheduling changes.

## 2022-06-02 ENCOUNTER — RESULTS ONLY (OUTPATIENT)
Dept: RADIATION ONCOLOGY | Facility: HOSPITAL | Age: 72
End: 2022-06-02
Payer: MEDICARE

## 2022-06-02 ENCOUNTER — OFFICE VISIT (OUTPATIENT)
Dept: RADIATION ONCOLOGY | Facility: HOSPITAL | Age: 72
End: 2022-06-02
Payer: MEDICARE

## 2022-06-02 ENCOUNTER — APPOINTMENT (OUTPATIENT)
Dept: RADIATION ONCOLOGY | Facility: HOSPITAL | Age: 72
End: 2022-06-02
Payer: MEDICARE

## 2022-06-02 DIAGNOSIS — C34.2 PRIMARY MALIGNANT NEOPLASM OF RIGHT MIDDLE LOBE OF LUNG (H): Primary | ICD-10-CM

## 2022-06-02 LAB
RAD ONC ARIA COURSE ID: NORMAL
RAD ONC ARIA COURSE LAST TREATMENT DATE: NORMAL
RAD ONC ARIA COURSE START DATE: NORMAL
RAD ONC ARIA COURSE TREATMENT ELAPSED DAYS: 0
RAD ONC ARIA FIRST TREATMENT DATE: NORMAL
RAD ONC ARIA PLAN FRACTIONS TREATED TO DATE: 1
RAD ONC ARIA PLAN ID: NORMAL
RAD ONC ARIA PLAN NAME: NORMAL
RAD ONC ARIA PLAN PRESCRIBED DOSE PER FRACTION: 10 GY
RAD ONC ARIA PLAN TOTAL FRACTIONS PRESCRIBED: 5
RAD ONC ARIA PLAN TOTAL PRESCRIBED DOSE: 5000 CGY
RAD ONC ARIA REFERENCE POINT DOSAGE GIVEN TO DATE: NORMAL GY
RAD ONC ARIA REFERENCE POINT ID: NORMAL

## 2022-06-02 PROCEDURE — 77373 STRTCTC BDY RAD THER TX DLVR: CPT | Performed by: RADIOLOGY

## 2022-06-02 ASSESSMENT — PAIN SCALES - GENERAL: PAINLEVEL: NO PAIN (0)

## 2022-06-03 ENCOUNTER — RESULTS ONLY (OUTPATIENT)
Dept: RADIATION ONCOLOGY | Facility: HOSPITAL | Age: 72
End: 2022-06-03
Payer: MEDICARE

## 2022-06-03 ENCOUNTER — APPOINTMENT (OUTPATIENT)
Dept: RADIATION ONCOLOGY | Facility: HOSPITAL | Age: 72
End: 2022-06-03
Payer: MEDICARE

## 2022-06-03 ENCOUNTER — OFFICE VISIT (OUTPATIENT)
Dept: RADIATION ONCOLOGY | Facility: HOSPITAL | Age: 72
End: 2022-06-03
Payer: MEDICARE

## 2022-06-03 VITALS — BODY MASS INDEX: 17.55 KG/M2 | WEIGHT: 107.1 LBS

## 2022-06-03 VITALS — BODY MASS INDEX: 17.45 KG/M2 | WEIGHT: 106.5 LBS

## 2022-06-03 DIAGNOSIS — C34.2 PRIMARY MALIGNANT NEOPLASM OF RIGHT MIDDLE LOBE OF LUNG (H): Primary | ICD-10-CM

## 2022-06-03 LAB
RAD ONC ARIA COURSE ID: NORMAL
RAD ONC ARIA COURSE LAST TREATMENT DATE: NORMAL
RAD ONC ARIA COURSE START DATE: NORMAL
RAD ONC ARIA COURSE TREATMENT ELAPSED DAYS: 1
RAD ONC ARIA FIRST TREATMENT DATE: NORMAL
RAD ONC ARIA PLAN FRACTIONS TREATED TO DATE: 2
RAD ONC ARIA PLAN ID: NORMAL
RAD ONC ARIA PLAN NAME: NORMAL
RAD ONC ARIA PLAN PRESCRIBED DOSE PER FRACTION: 10 GY
RAD ONC ARIA PLAN TOTAL FRACTIONS PRESCRIBED: 5
RAD ONC ARIA PLAN TOTAL PRESCRIBED DOSE: 5000 CGY
RAD ONC ARIA REFERENCE POINT DOSAGE GIVEN TO DATE: NORMAL GY
RAD ONC ARIA REFERENCE POINT ID: NORMAL

## 2022-06-03 ASSESSMENT — PAIN SCALES - GENERAL: PAINLEVEL: NO PAIN (0)

## 2022-06-03 NOTE — PROGRESS NOTES
[facility]  DEPARTMENT OF RADIATION ONCOLOGY  ON TREATMENT VISIT (OTV) NOTE    Name: Yvette Singletary MRN: [id number]   : 1950 (71 year old)  Date of Service: Roger 3, 2022 Referring: Dr. Brothers, III       Diagnosis and Cancer Staging  Primary malignant neoplasm of right middle lobe of lung (H)  Staging form: Lung, AJCC 8th Edition  - Clinical stage from 2021: Stage IA3 (cT1c, cN0, cM0) - Signed by Familia Crews MD on 10/19/2021    Malignant neoplasm of rectum (H)    Staging form: Colon and Rectum, AJCC 8th Edition    - Clinical stage from 2021: Stage IIA (cT3, cN0, cM0) - Signed by Familia Crews MD on 2021    - Pathologic stage from 2022: Stage IIA (ypT3, pN0, cM0) - Signed by Familia Crews MD on 2022      Current Radiation Therapy   Site: Right middle lobe (10MV SBRT, 2022)   Treatment to Date    Received: 2 fraction(s) = 2000 cGy (at 1000 each)    Remaining: 3 fraction(s)    Goal: 5 fractions = 5000 cGy     Radiation therapy week 0 (fractions 1-5): Grade 0 toxicity due to radiation therapy (CTCAE 5.0).    Prior Radiation Therapy   Treatment site: Pelvis with concurrent chemotherapy (weekly infusional 5-FU after 6 cycles of neoadjuvant FOLFOX chemotherapy).  Treatment intent: Curative.  Delivery method: Volumetric arc radiation therapy (VMAT) with sequential boost.  Energy: 10 MV.  Dose: 24 fractions of 180 cGy each for 4320 cGy (original plan was 25 fractions of 100 cGy each to 4,500 centigray followed by 3 fraction boost of 180 cGy for an additional 540 cGy for a total of 28 fractions for 5040 cGy).  Dates: 10/04/1021-2021 (Elapsed days: 32).    Concurrent chemoradiation week 0 (fractions 1-5): Grade 0 toxicity due to radiation therapy (CTCAE 5.0).   Week 1 (-10): Grade 0 toxicity.  Week 2 (-15): Grade 2 bowel toxicity likely due to radiation therapy (diarrhea requiring Lomotil; did not occur during neoadjuvant FOLFOX).  Week 3 (16-20): Grade 2 bowel  toxicity likely due to radiation therapy (diarrhea requiring Lomotil; did not occur during neoadjuvant FOLFOX).  Week 4 (21-24): Grade 2 bowel toxicity likely due to radiation therapy (diarrhea requiring Lomotil and Imodium). Treatment stopped due to shigellosis.     Recent History (CE)     General: Seen at linac. Feels well. Offers no new complaints. Reports that she feels that she has no toxicity from SBRT. Continues to practice deep-inspiration breath-hold (DIBH). Hold times up to almost 30 seconds. Finds setup and treatment to be easy.     Pulmonary: Stable at baseline. Minimal cough, no hemoptysis.  Index: Grade 0.  Intervention: Observe.  Impression: Mild risk of changes within 1-2 weeks.    Pain: Denies pleuritic pain.  Index: Grade 0.  Intervention: Observe.  Impression: Moderate risk over the next 1-2 weeks.    Skin, chest wall: Denies erythema, hyperpigmentation, or tenderness.  Index: Grade 0.  Intervention: Observe.  Impression: Mild changes possible within 1-2 weeks.    Fatigue: Stable at baseline (mild).  Index: Grade 0.  Intervention: Observe.  Impression: Progressive changes possible within 1-2 weeks.    ID: Daily COVID-19 screening negative for barriers to proceeding with radiation therapy.    Follow-up: After radiation therapy, refer back to colorectal surgery to schedule reversal of temporary diverting ileostomy. Refer back to medical oncology for clinical and radiographic surveillance of metachronous rectal and colon cancers. Anticipate routine telephone check in approximately 2 weeks and elective follow-up afterwards (relatively low risk of subacute or delayed radiation-related thoracic or pelvic toxicity).    Past Medical History:   Diagnosis Date     BMI 25.0-25.9,adult 01/21/2020     Chemotherapy-induced enteritis 11/09/2021     Cigarette smoker 10/24/2002     COPD (chronic obstructive pulmonary disease) (H)      Diarrhea, unspecified type 11/09/2021     Essential hypertension 07/05/2016      History of blood transfusion      Hypokalemia 10/25/2021    resolved     Hypomagnesemia 10/25/2021    resolved     Hyponatremia 2018    Acute on chronic hyponatremia     Hypophosphatemia 2021     Idiopathic chronic pancreatitis (H) 2018     Intestinal infection due to Plesiomonas shigelloides 2021     Lung cancer (H) 2021     Malignant neoplasm of lower lobe of right lung (H) 2021     Normocytic anemia 2021     Pressure injury of sacral region, stage 2 (H) 2021     Radiation enteritis 2021     Rectal cancer (H) 2021     Severe protein-calorie malnutrition (H) 2021     Ventral hernia without obstruction or gangrene 2022     Past Surgical History:   Procedure Laterality Date     AS  DELIVERY ONLY       AS LIGATE FALLOPIAN TUBE      Tubal Ligation     BIOPSY Right 2021    CT-guided core needle biopsy, right lung     COLONOSCOPY  2021     DILATION AND CURETTAGE       ENDOSCOPIC RETROGRADE CHOLANGIOPANCREATOGRAPHY  2018    Procedure: possible endoscopic retrograde chlangiopancreatography;  Surgeon: Jamarcus Haney MD;  Location: [facility] MAIN ORS     FLEXIBLE SIGMOIDOSCOPY  2021     HC US TRANSRECTAL  2021    rectal ultrasound.     hip replacements Bilateral      ILEOSTOMY  2022     OTHER SURGICAL HISTORY  2018    Procedure: endoscopic ultrasonography;  Surgeon: Jamarcus Haney MD;  Location: [facility] MAIN ORS     OTHER SURGICAL HISTORY  2018    Procedure: modified pancreatoduodenectomy with lateral pancreatojejunostomy, biposy liver segment three;  Surgeon: Ren,     OTHER SURGICAL HISTORY  2018    Procedure: EXAM UNDER ANESTHESIA GASTROENTEROLOGY;  Surgeon: Danial [place] MD;  Location: [facility] ENDOSCOPY 1ST ST     TOTAL HIP ARTHROPLASTY Left 2016    Procedure: left total hip arthroplasty;  Surgeon: SHUKRI Lamb MD;  Location: [facility] MAIN ORS     UPPER GASTROINTESTINAL ENDOSCOPY   05/16/2018    Procedure: esophagogastroduodenoscopy;  Surgeon: Jamarcus Haney MD;  Location: [facility] MAIN ORS     UPPER GASTROINTESTINAL ENDOSCOPY  10/29/2018    Procedure: ESOPHAGOGASTRODUODENOSCOPY DIAGNOSTIC;  Surgeon: Danial [place] MD;  Location: [facility] ENDOSCOPY 1ST ST     UPPER GASTROINTESTINAL ENDOSCOPY  11/05/2018    ESOPHAGOGASTRODUODENOSCOPY DIAGNOSTIC;  Surgeon: Danial [place] MD;  Location: [facility] ENDOSCOPY 1ST ST     WHIPPLE PROCEDURE  05/31/2018     ZZC TOTAL HIP ARTHROPLASTY Right 02/16/1999    Hip Replacement, Total, right hip     Outpatient Encounter Medications as of 6/3/2022   Medication Sig Dispense Refill     acetaminophen (TYLENOL) 325 MG tablet Take 325-650 mg by mouth every 6 hours as needed (Patient not taking: No sig reported)       amLODIPine (NORVASC) 5 MG tablet Take 5 mg by mouth       amylase-lipase-protease (CREON 36) 577149-81243-847822 units CPEP Take 36,000 Units by mouth 3 times daily (with meals)       calcium carbonate 500 MG CHEW Take 500 mg by mouth as needed Chew and swallow 500 mg as needed. Chew thoroughly. (Patient not taking: Reported on 5/23/2022)       ferrous fumarate 65 mg, Stebbins. FE,-Vitamin C 125 mg (VITRON-C)  MG TABS tablet        loperamide (IMODIUM) 2 MG capsule Take 2 mg by mouth 4 times daily as needed (Patient not taking: Reported on 5/23/2022)       omeprazole (PRILOSEC) 20 MG DR capsule Take 20 mg by mouth daily       prochlorperazine (COMPAZINE) 10 MG tablet Take 10 mg by mouth as needed (Patient not taking: Reported on 5/23/2022)       TRELEGY ELLIPTA 200-62.5-25 MCG/INH oral inhaler        vitamin D3 (CHOLECALCIFEROL) 125 MCG (5000 UT) tablet Take 5,000 Units by mouth       No facility-administered encounter medications on file as of 6/3/2022.     Allergies/Reactions: Fentanyl and related    No orders of the defined types were placed in this encounter.    Social History     Social History Narrative    [place] [address] [place]    Quit smoking  "\"cold turkey\"    Enjoys Gardening    3 children(1 set of twins)     Socioeconomic History     Marital status: Single     Social History     Tobacco Use     Smoking status: Former Smoker     Types: Cigarettes     Quit date: 2021     Years since quittin.3     Smokeless tobacco: Never Used   Vaping Use     Vaping Use: Never used   Substance Use Topics     Alcohol use: Yes     Comment: 8 drinks a week/ gin     Drug use: Not Currently     Types: Marijuana     Comment: Has not used marijuana in years-2022     Family History   Problem Relation Age of Onset     Cerebrovascular Disease Father    No other cancers in first or second degree relatives.    Baseline Review of Systems (prior to radiation therapy; supplemental to the History)   ROS (score of 0 indicates that symptom is at baseline for most sections below): See Flowsheet for additional comments as indicated.  No Pain (0)                                 Physical Exam   KPS: 70 (cares for self but unable to carry on normal activity or do active work).  ECOG Status: 2 (Ambulatory and capable of all selfcare but unable to carry out any work activities; may need help with IADLs up and about > 50% of waking hours)  Vitals: Wt 48.6 kg (107 lb 1.6 oz)   BMI 17.55 kg/m    Wt Readings from Last 3 Encounters:   22 48.6 kg (107 lb 1.6 oz)   22 48.3 kg (106 lb 8 oz)   22 49.2 kg (108 lb 8 oz)     Clinical Examination:  General: Appears upbeat, bright, dynamic, engaging, and well. Does not appear excessively thin gaunt. Appears clinically/medically stable. Appears rested. Appears stated age. Appears well-developed, well-nourished, and in no acute distress. Does not appear acutely or chronically ill. Appears well groomed.  Head: No alopecia. Normocephalic.  Eyes: No glasses. Anicteric, vision intact.  ENT: Good volume. No hoarseness.  Neck: Trachea midline.  Lymphatics: Deferred.  Chest/Breasts: Left chest wall port. No implanted cardiac " device.  Lungs: No oxygen. No delay of phases.  Easy respirations, no use of accessory muscles. Clear to auscultation  Cardiovascular: No jugulovenous distension. No carotid pulsations. RRR, S1, S2.  Abdomen: Deferred. (Previously, right abdominal wall diverting ileostomy site is pink, clean, dry, intact. Hygiene appears excellent. No cellulitis. Small amount of brown stool in the bag. No erythema. Nondistended, nontender.)  Pelvis: Deferred.  MSK: Shoulder range of motion is good. No arm edema or hand edema. Good muscle tone. No tenderness on palpation. Good posture.  Integument: No jaundice or pallor.   Neurologic: Right-handed. Alert, oriented, fluent. Memory intact. Motor intact. Sensory intact. No ataxia.  Psychologic: Well-spoken about her cancer and therapy. Good dynamic with her family. Motivated. Pleasant, cooperative, insightful, concrete, and good judgment.    Physician Time on Day of Encounter, and [facility] Data Reviewed and Analyzed on Day of Encounter   For SBRT, present for the patient's setup and treatment. With the patient, we verified their identification, consent, site, and side of treatment. Patient ID, site verification, and timeout procedures completed. We setup the patient at linac for SBRT. At the linac, completed routine QA with Physics and Therapy. Treatment plan and patient history reviewed. Patient tolerated setup well and appeared comfortable. Staff and I reviewed onboard imaging and QA. Imaging and setup were adequate for therapy. Proceeded with therapy. Treatment delivered as planned and prescribed. Patient tolerated therapy well. Was monitored continuously during setup, treatment, and afterward. Experienced no pain or complications.    Physician Radiation Therapy Information Review   Radiation Oncology weekly review: Reviewed available labs and diagnostic studies. Interpreted as adequate to proceed with radiation therapy. Discussed management with Therapy, Treatment Planning, and Nursing.  Review of weekly routine QA, linac imaging, and clinical set up are adequate to proceed with radiation therapy as prescribed.    Physician Radiation Therapy Assessment   Routine tolerance to radiation therapy.    Physician Radiation Therapy Plan   No new radiation therapy interventions. No boost/cone down. Have reviewed the graphical 3D plan with the patient with attention to dose to the right lung, chest wall, skin, heart, spinal cord, etc. Reviewed anticipated time course, nature, and potential interventions for managing toxicity during and after radiation therapy. Patient aware of onsite and telemedicine coverage of our clinic. She wished to proceed with treatment. All questions answered to the satisfaction of the patient.      Familia Crews MD, PhD  Department of Radiation Oncology  Tel: [phone]  Fax: [phone]    Care Team:  Viraj Brothers III, M.D. (medical oncology)  Marina Haines M.D. (colorectal surgery)  Beverely Laurent M.D. (medicine)

## 2022-06-03 NOTE — PROGRESS NOTES
[facility]  DEPARTMENT OF RADIATION ONCOLOGY  ON TREATMENT VISIT (OTV) NOTE    Name: Yvette Singletary MRN: [id number]   : 1950 (71 year old)  Date of Service: 2022 Referring: Dr. Brothers, III       Diagnosis and Cancer Staging  Primary malignant neoplasm of right middle lobe of lung (H)  Staging form: Lung, AJCC 8th Edition  - Clinical stage from 2021: Stage IA3 (cT1c, cN0, cM0) - Signed by Familia Crews MD on 10/19/2021    Malignant neoplasm of rectum (H)    Staging form: Colon and Rectum, AJCC 8th Edition    - Clinical stage from 2021: Stage IIA (cT3, cN0, cM0) - Signed by Familia Crews MD on 2021    - Pathologic stage from 2022: Stage IIA (ypT3, pN0, cM0) - Signed by Familia Crews MD on 2022      Current Radiation Therapy   Site: Right middle lobe (10MV SBRT, 2022)   Treatment to Date    Received: 1 fraction(s) = 1000 cGy (at 1000 each)    Remainin fraction(s)    Goal: 5 fractions = 5000 cGy     Radiation therapy week 0 (fractions 1-5): Grade 0 toxicity due to radiation therapy (CTCAE 5.0).    Prior Radiation Therapy   Treatment site: Pelvis with concurrent chemotherapy (weekly infusional 5-FU after 6 cycles of neoadjuvant FOLFOX chemotherapy).  Treatment intent: Curative.  Delivery method: Volumetric arc radiation therapy (VMAT) with sequential boost.  Energy: 10 MV.  Dose: 24 fractions of 180 cGy each for 4320 cGy (original plan was 25 fractions of 100 cGy each to 4,500 centigray followed by 3 fraction boost of 180 cGy for an additional 540 cGy for a total of 28 fractions for 5040 cGy).  Dates: 10/04/1021-2021 (Elapsed days: 32).    Concurrent chemoradiation week 0 (fractions 1-5): Grade 0 toxicity due to radiation therapy (CTCAE 5.0).   Week 1 (-10): Grade 0 toxicity.  Week 2 (-15): Grade 2 bowel toxicity likely due to radiation therapy (diarrhea requiring Lomotil; did not occur during neoadjuvant FOLFOX).  Week 3 (16-20): Grade 2 bowel  toxicity likely due to radiation therapy (diarrhea requiring Lomotil; did not occur during neoadjuvant FOLFOX).  Week 4 (21-24): Grade 2 bowel toxicity likely due to radiation therapy (diarrhea requiring Lomotil and Imodium). Treatment stopped due to shigellosis.     Recent History (CE)     General: Seen at linac. Feels well. Offers no new complaints. Continues to practice deep-inspiration breath-hold (DIBH). Hold times up to almost 30 seconds. Finds setup and treatment to be easy.     Pulmonary: Stable at baseline. Minimal cough, no hemoptysis.  Index: Grade 0.  Intervention: Observe.  Impression: Anticipate possible changes within 1-2 weeks.    Pain: Denies pleuritic pain.  Index: Grade 0.  Intervention: Observe.  Impression: Moderate risk over the next 1-2 weeks.    Skin, chest wall: Baseline.  Index: Grade 0.  Intervention: Observe.  Impression: Mild changes possible within 1-2 weeks.    Fatigue: Stable at baseline (mild).  Index: Grade 0.  Intervention: Observe.  Impression: Progressive changes possible within 1-2 weeks.    ID: Daily COVID-19 screening negative for barriers to proceeding with radiation therapy.    Follow-up: After radiation therapy, refer back to colorectal surgery to schedule reversal of temporary diverting ileostomy. Refer back to medical oncology for clinical and radiographic surveillance of metachronous rectal and colon cancers. Anticipate routine telephone check in approximately 2 weeks and elective follow-up afterwards (relatively low risk of subacute or delayed radiation-related thoracic or pelvic toxicity).    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 2021     Lung cancer (H) 2021     Malignant neoplasm of lower lobe of right lung (H) 2021     Normocytic anemia 2021     Pressure injury of sacral region, stage 2 (H) 2021     Radiation enteritis 2021     Rectal cancer (H) 2021     Severe protein-calorie malnutrition (H) 2021     Ventral hernia without obstruction or gangrene 2022     Past Surgical History:   Procedure Laterality Date     AS  DELIVERY ONLY       AS LIGATE FALLOPIAN TUBE  1986    Tubal Ligation     BIOPSY Right 2021    CT-guided core needle biopsy, right lung     COLONOSCOPY  2021     DILATION AND CURETTAGE       ENDOSCOPIC RETROGRADE CHOLANGIOPANCREATOGRAPHY  2018    Procedure: possible endoscopic retrograde chlangiopancreatography;  Surgeon: Jamarcus Haney MD;  Location: [facility] MAIN ORS     FLEXIBLE SIGMOIDOSCOPY  2021     HC US TRANSRECTAL  2021    rectal ultrasound.     hip replacements Bilateral      ILEOSTOMY  2022     OTHER SURGICAL HISTORY  2018    Procedure: endoscopic ultrasonography;  Surgeon: Jamarcus Haney MD;  Location: [facility] MAIN [facility]     OTHER SURGICAL HISTORY  2018    Procedure: modified pancreatoduodenectomy with lateral pancreatojejunostomy, biposy liver segment three;  Surgeon: Ren,     OTHER SURGICAL HISTORY  2018    Procedure: EXAM UNDER ANESTHESIA GASTROENTEROLOGY;  Surgeon: Danial [place] MD;  Location: [facility] ENDOSCOPY 1ST ST     TOTAL HIP ARTHROPLASTY Left 2016    Procedure: left total hip arthroplasty;  Surgeon: SHUKRI Lamb MD;  Location: [facility] MAIN ORS     UPPER GASTROINTESTINAL ENDOSCOPY  2018    Procedure: esophagogastroduodenoscopy;  Surgeon: Jamarcus Haney MD;  Location:  "[facility] MAIN ORS     UPPER GASTROINTESTINAL ENDOSCOPY  10/29/2018    Procedure: ESOPHAGOGASTRODUODENOSCOPY DIAGNOSTIC;  Surgeon: Danial [place] MD;  Location: [facility] ENDOSCOPY 1ST ST     UPPER GASTROINTESTINAL ENDOSCOPY  11/05/2018    ESOPHAGOGASTRODUODENOSCOPY DIAGNOSTIC;  Surgeon: Danial [place] MD;  Location: [facility] ENDOSCOPY 1ST ST     WHIPPLE PROCEDURE  05/31/2018     ZZC TOTAL HIP ARTHROPLASTY Right 02/16/1999    Hip Replacement, Total, right hip     Outpatient Encounter Medications as of 6/2/2022   Medication Sig Dispense Refill     acetaminophen (TYLENOL) 325 MG tablet Take 325-650 mg by mouth every 6 hours as needed (Patient not taking: No sig reported)       amLODIPine (NORVASC) 5 MG tablet Take 5 mg by mouth       amylase-lipase-protease (CREON 36) [id number] units CPEP Take 36,000 Units by mouth 3 times daily (with meals)       calcium carbonate 500 MG CHEW Take 500 mg by mouth as needed Chew and swallow 500 mg as needed. Chew thoroughly. (Patient not taking: Reported on 5/23/2022)       ferrous fumarate 65 mg, [place]. FE,-Vitamin C 125 mg (VITRON-C)  MG TABS tablet        loperamide (IMODIUM) 2 MG capsule Take 2 mg by mouth 4 times daily as needed (Patient not taking: Reported on 5/23/2022)       omeprazole (PRILOSEC) 20 MG DR capsule Take 20 mg by mouth daily       prochlorperazine (COMPAZINE) 10 MG tablet Take 10 mg by mouth as needed (Patient not taking: Reported on 5/23/2022)       TRELEGY ELLIPTA 200-62.5-25 MCG/INH oral inhaler        vitamin D3 (CHOLECALCIFEROL) 125 MCG (5000 UT) tablet Take 5,000 Units by mouth       No facility-administered encounter medications on file as of 6/2/2022.     Allergies/Reactions: Fentanyl and related    No orders of the defined types were placed in this encounter.    Social History     Social History Narrative    [place] [address] [place]    Quit smoking \"cold turkey\"    Enjoys Gardening    3 children(1 set of twins)     Socioeconomic History     " Marital status: Single     Social History     Tobacco Use     Smoking status: Former Smoker     Types: Cigarettes     Quit date: 2021     Years since quittin.3     Smokeless tobacco: Never Used   Vaping Use     Vaping Use: Never used   Substance Use Topics     Alcohol use: Yes     Comment: 8 drinks a week/ gin     Drug use: Not Currently     Types: Marijuana     Comment: Has not used marijuana in years-2022     Family History   Problem Relation Age of Onset     Cerebrovascular Disease Father    No other cancers in first or second degree relatives.    Baseline Review of Systems (prior to radiation therapy; supplemental to the History)   ROS (score of 0 indicates that symptom is at baseline for most sections below): See Flowsheet for additional comments as indicated.  No Pain (0)                                 Physical Exam   KPS: 70 (cares for self but unable to carry on normal activity or do active work).  ECOG Status: 2 (Ambulatory and capable of all selfcare but unable to carry out any work activities; may need help with IADLs up and about > 50% of waking hours)  Vitals: Wt 48.3 kg (106 lb 8 oz)   BMI 17.45 kg/m    Wt Readings from Last 3 Encounters:   22 48.3 kg (106 lb 8 oz)   22 49.2 kg (108 lb 8 oz)   10/27/21 52.3 kg (115 lb 6.4 oz)     Clinical Examination:  General: Appears upbeat, bright, dynamic, engaging, and well. Does not appear excessively thin gaunt. Appears clinically/medically stable. Appears rested. Appears stated age. Appears well-developed, well-nourished, and in no acute distress. Does not appear acutely or chronically ill. Appears well groomed.  Head: No alopecia. Normocephalic.  Eyes: No glasses. Anicteric, vision intact.  ENT: Good volume. No hoarseness.  Neck: Soft, supple, symmetric, trachea midline.  Lymphatics: No cervical or supraclavicular adenopathy.  Chest/Breasts: Left chest wall port. No implanted cardiac device.  Lungs: No oxygen. No delay of phases.   Easy respirations, no use of accessory muscles. Clear to auscultation  Cardiovascular: No jugulovenous distension. No carotid pulsations. RRR, S1, S2.  Abdomen: Right abdominal wall diverting ileostomy site is pink, clean, dry, intact. Hygiene appears excellent. No cellulitis. Small amount of brown stool in the bag. No erythema. Nondistended, nontender.  Pelvis: No erythema. Nondistended, nontender.  MSK: Shoulder range of motion is good. No arm edema or hand edema. Good muscle tone. No tenderness on palpation. Good posture. No cords or calf tenderness.  Integument: No jaundice or pallor.   Neurologic: Right-handed. Alert, oriented, fluent. Memory intact. Motor intact. Sensory intact. No ataxia.  Psychologic: Well-spoken about her cancer and therapy. Good dynamic with her family. Motivated. Pleasant, cooperative, insightful, concrete, and good judgment.    Physician Time on Day of Encounter, and [facility] Data Reviewed and Analyzed on Day of Encounter   For SBRT, present for the patient's setup and treatment. With the patient, we verified their identification, consent, site, and side of treatment. Patient ID, site verification, and timeout procedures completed. We setup the patient at linac for SBRT. At the linac, completed routine QA with Physics and Therapy. Treatment plan and patient history reviewed. Patient tolerated setup well and appeared comfortable. Staff and I reviewed onboard imaging and QA. Imaging and setup were adequate for therapy. Proceeded with therapy. Treatment delivered as planned and prescribed. Patient tolerated therapy well. Was monitored continuously during setup, treatment, and afterward. Experienced no pain or complications.    Physician Radiation Therapy Information Review   Radiation Oncology weekly review: Reviewed available labs and diagnostic studies. Interpreted as adequate to proceed with radiation therapy. Discussed management with Therapy, Treatment Planning, and Nursing. Review of  weekly routine QA, linac imaging, and clinical set up are adequate to proceed with radiation therapy as prescribed.    Physician Radiation Therapy Assessment   Routine tolerance to radiation therapy.    Physician Radiation Therapy Plan   No new radiation therapy interventions. No boost/cone down. Reviewed the graphical 3D plan with the patient with attention to dose to the right lung, chest wall, skin, heart, spinal cord, etc. Reviewed anticipated time course, nature, and potential interventions for managing toxicity during and after radiation therapy. Patient aware of onsite and telemedicine coverage of our clinic. She wished to proceed with treatment. All questions answered to the satisfaction of the patient.      Familia Crews MD, PhD  Department of Radiation Oncology  Tel: [phone]  Fax: [phone]    Care Team:  Viraj Brothers III, M.D. (medical oncology)  Marina Haines M.D. (colorectal surgery)  Beverley Laurent M.D. (medicine)

## 2022-06-06 ENCOUNTER — RESULTS ONLY (OUTPATIENT)
Dept: RADIATION ONCOLOGY | Facility: HOSPITAL | Age: 72
End: 2022-06-06
Payer: MEDICARE

## 2022-06-06 ENCOUNTER — APPOINTMENT (OUTPATIENT)
Dept: RADIATION ONCOLOGY | Facility: HOSPITAL | Age: 72
End: 2022-06-06
Payer: MEDICARE

## 2022-06-06 ENCOUNTER — OFFICE VISIT (OUTPATIENT)
Dept: RADIATION ONCOLOGY | Facility: HOSPITAL | Age: 72
End: 2022-06-06
Payer: MEDICARE

## 2022-06-06 DIAGNOSIS — C34.2 PRIMARY MALIGNANT NEOPLASM OF RIGHT MIDDLE LOBE OF LUNG (H): Primary | ICD-10-CM

## 2022-06-06 LAB
RAD ONC ARIA COURSE ID: NORMAL
RAD ONC ARIA COURSE LAST TREATMENT DATE: NORMAL
RAD ONC ARIA COURSE START DATE: NORMAL
RAD ONC ARIA COURSE TREATMENT ELAPSED DAYS: 4
RAD ONC ARIA FIRST TREATMENT DATE: NORMAL
RAD ONC ARIA PLAN FRACTIONS TREATED TO DATE: 3
RAD ONC ARIA PLAN ID: NORMAL
RAD ONC ARIA PLAN NAME: NORMAL
RAD ONC ARIA PLAN PRESCRIBED DOSE PER FRACTION: 10 GY
RAD ONC ARIA PLAN TOTAL FRACTIONS PRESCRIBED: 5
RAD ONC ARIA PLAN TOTAL PRESCRIBED DOSE: 5000 CGY
RAD ONC ARIA REFERENCE POINT DOSAGE GIVEN TO DATE: NORMAL GY
RAD ONC ARIA REFERENCE POINT ID: NORMAL

## 2022-06-06 PROCEDURE — 77373 STRTCTC BDY RAD THER TX DLVR: CPT | Performed by: RADIOLOGY

## 2022-06-06 NOTE — PROGRESS NOTES
Progress Notes  Encounter Date: Jun 6, 2022  Joy Diaz MD     RADIATION ONCOLOGY WEEKLY MANAGEMENT PROGRESS NOTE     Patient Care Team       Relationship Specialty Notifications Start End    Lm-Marisoljose de jesus Beverley PCP - General Nurse Practitioner  9/10/21     Phone: [phone] Fax: [phone]         [address] [facility] [place] [address]    Familia Crwes MD MD Radiation Oncology  9/10/21     Phone: [phone] Fax: [phone]         [address] [place] [address]    Dinah Mccarty MD MD Emergency Medicine  9/10/21     Phone: [phone] Fax: [phone]         [facility] [address] [address]    Monae Haines MD, MD MD Colon & Rectal  9/10/21     Phone: [phone] Fax: [phone]         [address] [facility] [address]    Viraj Brothers MD MD Hematology  9/10/21     Phone: [phone] Fax: [phone]         [facility] [address] [place] [address]    Familia Crews MD Assigned Cancer Care Provider   10/10/21     Phone: [phone] Fax: [phone]         [address] [place] [address]                  DIAGNOSIS: 71-year-old female with a right middle lobe lung cancer stage IA3      RADIATION THERAPY:    Yvette Singletary has received 50 Gy to date to right lung lesion.  Treatment was given with stereotactic body radiation therapy with breath-hold technique in 5 fractions over 8 elapsed days.      SUBJECTIVE:    Currently She is tolerating treatment well.  She offers no complaints at this time.         OBJECTIVE:    WEIGHT: .  Examination reveals well-developed well-nourished female in no apparent distress.  Breathing comfortably      IMPRESSION:  Routine tolerance to radiation therapy.       PLAN:  Continue treatment as planned .        Medical record and imaging reviewed.      Joy Diaz MD

## 2022-06-08 ENCOUNTER — RESULTS ONLY (OUTPATIENT)
Dept: RADIATION ONCOLOGY | Facility: HOSPITAL | Age: 72
End: 2022-06-08
Payer: MEDICARE

## 2022-06-08 ENCOUNTER — OFFICE VISIT (OUTPATIENT)
Dept: RADIATION ONCOLOGY | Facility: HOSPITAL | Age: 72
End: 2022-06-08
Payer: MEDICARE

## 2022-06-08 DIAGNOSIS — C34.2 PRIMARY MALIGNANT NEOPLASM OF RIGHT MIDDLE LOBE OF LUNG (H): Primary | ICD-10-CM

## 2022-06-08 LAB
RAD ONC ARIA COURSE ID: NORMAL
RAD ONC ARIA COURSE LAST TREATMENT DATE: NORMAL
RAD ONC ARIA COURSE START DATE: NORMAL
RAD ONC ARIA COURSE TREATMENT ELAPSED DAYS: 6
RAD ONC ARIA FIRST TREATMENT DATE: NORMAL
RAD ONC ARIA PLAN FRACTIONS TREATED TO DATE: 4
RAD ONC ARIA PLAN ID: NORMAL
RAD ONC ARIA PLAN NAME: NORMAL
RAD ONC ARIA PLAN PRESCRIBED DOSE PER FRACTION: 10 GY
RAD ONC ARIA PLAN TOTAL FRACTIONS PRESCRIBED: 5
RAD ONC ARIA PLAN TOTAL PRESCRIBED DOSE: 5000 CGY
RAD ONC ARIA REFERENCE POINT DOSAGE GIVEN TO DATE: NORMAL GY
RAD ONC ARIA REFERENCE POINT ID: NORMAL

## 2022-06-08 PROCEDURE — 77373 STRTCTC BDY RAD THER TX DLVR: CPT | Performed by: RADIOLOGY

## 2022-06-08 NOTE — PROGRESS NOTES
Progress Notes  Encounter Date: Jun 8, 2022  Joy Diaz MD     RADIATION ONCOLOGY WEEKLY MANAGEMENT PROGRESS NOTE     Patient Care Team       Relationship Specialty Notifications Start End    York-Angelo Beverley PCP - General Nurse Practitioner  9/10/21     Phone: [phone] Fax: [phone]         [address] [facility] [place] [address]    Familia Crews MD MD Radiation Oncology  9/10/21     Phone: [phone] Fax: [phone]         [address] [place] [address]    Dinah Mccarty MD MD Emergency Medicine  9/10/21     Phone: [phone] Fax: [phone]         [facility] [address] [address]    Monae Haines MD, MD MD Colon & Rectal  9/10/21     Phone: [phone] Fax: [phone]         [address] [facility] [address]    Viraj Brothers MD MD Hematology  9/10/21     Phone: [phone] Fax: [phone]         [facility] [address] [place] [address]    Familia Crews MD Assigned Cancer Care Provider   10/10/21     Phone: [phone] Fax: [phone]         [address] [place] [address]                DIAGNOSIS: Malignant neoplasm of the right middle lobe lung, stage IA3      RADIATION THERAPY:    Yvette Singletary has received 4000 Gy to date to right chest with SBRT technique.       SUBJECTIVE:    Currently She  Is tolerating RT well. She is compliant with breath hold technique.   She does not have any complaints.       OBJECTIVE:    WEIGHT: Not taken Examination reveals patient is awake alert and oriented.  She is breathing comfortably without cough at this time.        IMPRESSION:  Routine tolerance to radiation therapy.       PLAN:  Continue treatment as planned .        Medical record and imaging reviewed.      Joy Diaz MD

## 2022-06-10 ENCOUNTER — OFFICE VISIT (OUTPATIENT)
Dept: RADIATION ONCOLOGY | Facility: HOSPITAL | Age: 72
End: 2022-06-10
Payer: MEDICARE

## 2022-06-10 ENCOUNTER — RESULTS ONLY (OUTPATIENT)
Dept: RADIATION ONCOLOGY | Facility: HOSPITAL | Age: 72
End: 2022-06-10
Payer: MEDICARE

## 2022-06-10 VITALS — WEIGHT: 109 LBS | BODY MASS INDEX: 17.86 KG/M2

## 2022-06-10 DIAGNOSIS — C34.2 PRIMARY MALIGNANT NEOPLASM OF RIGHT MIDDLE LOBE OF LUNG (H): Primary | ICD-10-CM

## 2022-06-10 DIAGNOSIS — C20 MALIGNANT NEOPLASM OF RECTUM (H): ICD-10-CM

## 2022-06-10 LAB
RAD ONC ARIA COURSE ID: NORMAL
RAD ONC ARIA COURSE LAST TREATMENT DATE: NORMAL
RAD ONC ARIA COURSE START DATE: NORMAL
RAD ONC ARIA COURSE TREATMENT ELAPSED DAYS: 8
RAD ONC ARIA FIRST TREATMENT DATE: NORMAL
RAD ONC ARIA PLAN FRACTIONS TREATED TO DATE: 5
RAD ONC ARIA PLAN ID: NORMAL
RAD ONC ARIA PLAN NAME: NORMAL
RAD ONC ARIA PLAN PRESCRIBED DOSE PER FRACTION: 10 GY
RAD ONC ARIA PLAN TOTAL FRACTIONS PRESCRIBED: 5
RAD ONC ARIA PLAN TOTAL PRESCRIBED DOSE: 5000 CGY
RAD ONC ARIA REFERENCE POINT DOSAGE GIVEN TO DATE: NORMAL GY
RAD ONC ARIA REFERENCE POINT ID: NORMAL

## 2022-06-10 PROCEDURE — 77336 RADIATION PHYSICS CONSULT: CPT | Performed by: RADIOLOGY

## 2022-06-10 PROCEDURE — 77373 STRTCTC BDY RAD THER TX DLVR: CPT | Performed by: RADIOLOGY

## 2022-06-10 ASSESSMENT — PAIN SCALES - GENERAL: PAINLEVEL: NO PAIN (0)

## 2022-06-10 NOTE — PROGRESS NOTES
RADIATION THERAPY TREATMENT SUMMARY        Name: Yvette Singletary MRN: [id number]  : 1950   Date of Service: 6/10/2022  Referring: Viraj Brothers MD      DIAGNOSIS: Malignant neoplasm of the right middle lobe lung, stage IA 3      TREATMENT INTENT:   Curative      PRIMARY AREA TREATED: Right middle lobe lung lesion       PRIMARY TREATMENT TECHNIQUE: Stereotactic body radiation therapy to lung      PRIMARY ENERGY: 10 MV.       PRIMARY TUMOR DOSE: 5 Gy.       NUMBER OF TREATMENTS: 5 fractions.       BOOST AREA TREATED: Not applicable       ELAPSED CALENDAR DAYS:   8 elapsed days.        COMPLETION DATE: 6/10/2022      COMMENTS: Yvette Singletary was treated to the right chest for lung carcinoma.      She tolerated her course of treatment well.  Today she weighed 109 pounds, she has regained 11 pounds although her precancer weight was 150.    She has a past history of rectal carcinoma stage IIa.  She plans to see her colorectal surgeon to reverse her ileostomy following Rt..    She is scheduled to return for follow-up with Dr. Deneen Diaz MD

## 2022-06-17 ENCOUNTER — TELEPHONE (OUTPATIENT)
Dept: RADIATION ONCOLOGY | Facility: HOSPITAL | Age: 72
End: 2022-06-17
Payer: MEDICARE

## 2022-06-17 DIAGNOSIS — C20 MALIGNANT NEOPLASM OF RECTUM (H): Primary | ICD-10-CM

## 2022-06-17 NOTE — PROGRESS NOTES
Progress Notes  Encounter Date: Jun 6, 2022  Joy Diaz MD     RADIATION ONCOLOGY WEEKLY MANAGEMENT PROGRESS NOTE     Patient Care Team       Relationship Specialty Notifications Start End    York-Marisoljose de jesus Beverley PCP - General Nurse Practitioner  9/10/21     Phone: [phone] Fax: [phone]         [address] [facility] [place] [address]    Familia Crews MD MD Radiation Oncology  9/10/21     Phone: [phone] Fax: [phone]         [address] [place] [address]    Dinah Mccarty MD MD Emergency Medicine  9/10/21     Phone: [phone] Fax: [phone]         [facility] [address] [address]    Monae Haines MD, MD MD Colon & Rectal  9/10/21     Phone: [phone] Fax: [phone]         [address] [facility] [address]    Viraj Brothers MD MD Hematology  9/10/21     Phone: [phone] Fax: [phone]         [facility] [address] [place] [address]    Familia Crews MD Assigned Cancer Care Provider   10/10/21     Phone: [phone] Fax: [phone]         [address] [place] [address]                DIAGNOSIS: Right middle Lobe lung cancer      RADIATION THERAPY:    Yvette Singletary is receiving Stereotactic body radiation therapy for lung cancer. Set up an imaging were observed at the treatment console.       PLAN:   Imaging reviewed.  Continue treatment as planned .             Joy Diaz MD         Walk in

## 2022-07-12 ENCOUNTER — TELEPHONE (OUTPATIENT)
Dept: RADIATION ONCOLOGY | Facility: HOSPITAL | Age: 72
End: 2022-07-12

## 2022-07-12 NOTE — TELEPHONE ENCOUNTER
"Four week follow up call made to patient post radiation therapy for malignant neoplasm of right middle lobe of lung.  Yvette stated, \"I am doing fine\". No complaints of pain, denies fevers/chills, no nausea/vomiting, eats a regular diet. Stated, \"I am gaining weight, I was 113 pounds this morning\". Reports, she drinks milk in the morning which causes mucous and makes her cough up a clear translucent sputum.  She checks her oxygen and uses it as needed.  \"I get some breathlessness every once in a while, but it's not bad\".  \"I have been tired\".  Takes a nap when needed. Denies skin issues related to radiation.  She has a follow up with her Surgeon on July 22nd to see if she can have her ileostomy reversed. \"I had a barium enema, to check and see if everything is ok, it showed no leaking or problems so I hope to have the surgery\".  She also has a follow up appointment with Dr. Brothers, September 9th.  No further questions or concerns at this time.   "

## 2023-08-19 NOTE — PROGRESS NOTES
[facility]  DEPARTMENT OF RADIATION ONCOLOGY   ON TREATMENT VISIT (OTV) NOTE    Name: Yvette Singletary MRN: [id number]   : 1950 (71 year old)  Date of Service: Oct 13, 2021 Referring: Dr. Brothers     Diagnosis and Cancer Staging  Malignant neoplasm of rectum (H)  Staging form: Colon and Rectum, AJCC 8th Edition  - Clinical stage from 2021: Stage IIA (cT3, cN0, cM0) - Signed by Familia Crews MD on 2021      Radiation Therapy   Site: Pelvis.   Fraction(s) = Dose (cGy)    Received 8 = 1440 (at 180 each)    Remaining 17 + 3 fractions    Goal 28 (25 + 3) = 5040 (4500 + 540)     Site: Right middle lobe (stereotactic body radiotherapy (SBRT)).   Fraction(s) = Dose (cGy)    Received Pending after pelvic XRT    Remaining Pending    Goal Pending   (formatted for Proginet)    Concurrent chemoradiation week 0 (fractions 1-5): Grade 0 toxicity due to radiation therapy (CTCAE 5.0).   Concurrent chemoradiation Week 2 (11-15): Grade 2 bowel toxicity likely due to radiation therapy (diarrhea requiring Lomotil; did not occur during neoadjuvant FOLFOX).     Recent History (E)   General: Receiving concurrent chemoradiation with weekly infusional 5-FU (C1D1 10/4/2021). Accompanied by . Feels over all well despite new bowel and energy changes as below.     Primary issues:   1. GI: New report of 3 bouts of diarrhea (loose/ watery) during the day and 3-4 bouts during the night. Minimal cramping. Taking Lomotil 4 times daily. Planning to take a second dose at bedtime to help eliminate nighttime diarrhea episodes. No reported mucous discharge, rectal bleeding, nausea, vomiting, or pain.  Index: Grade 2 due to radiation therapy   Intervention: Escalate Lomotil as needed and additional dose a bedtime.  Impression: Anticipate additional progression over the coming weeks.  2. Fatigue:  Although feeling much more rested that early during chemotherapy, new report of decreased energy level at times. Is  "going to bed earlier and sleeping later, waking due to diarrhea, not napping at this time, and feels rested for much of the day. Baseline activities include physical therapy, reading, baking, and household chores. Still needs a wheelchair for long distances due to getting fatigued (e.g, in a store, needs something to hold onto if walking longer distances).   Index: Grade 1.  Intervention: Observe.  Impression: Possible additional changes within weeks.  3. :  Denies significant changes from baseline. \"Maybe\" is urinating less during the day. No reported dysuria, hematuria, leakage, or incontinence.   Index: Grade 0.  Intervention: AlphaBlockerVsObserve.   Impression: Possible changes within 1-2 weeks.  4. Lung cancer: Patient eager to proceed with arrangements for SBRT for her right middle lobe primary cancer. Again reviewed the relative risks, benefits, rationale, potential side effects, alternatives, adjuncts, and adjuvants to radiation therapy to the right lung after preoperative concurrent chemoradiation for rectal cancer. Patient wished to proceed with arrangements and treatment. Written consent obtained with the staff. Will work with the patient to schedule simulation within days of completing radiation therapy.    Related active issues:   5. ID: Daily COVID-19 screening negative for barriers to proceeding with radiation therapy.  6. Follow-up: After radiation therapy, refer back to  medical oncology and colorectal surgery for scheduling of surgery.    Past Medical History:   Diagnosis Date     Cancer (H)      COPD (chronic obstructive pulmonary disease) (H)      History of blood transfusion      Past Surgical History:   Procedure Laterality Date     hip replacements Bilateral      Outpatient Medications Prior to Visit   Medication Sig Dispense Refill     amylase-lipase-protease (CREON 36) [id number] units CPEP Take 36,000 Units by mouth 3 times daily (with meals)       loperamide (IMODIUM) 2 MG " "capsule Take 2 mg by mouth 4 times daily as needed       omeprazole (PRILOSEC) 20 MG DR capsule Take 20 mg by mouth daily       prochlorperazine (COMPAZINE) 10 MG tablet Take 10 mg by mouth as needed       umeclidinium-vilanterol (ANORO ELLIPTA) 62.5-25 MCG/INH oral inhaler Inhale 1 puff into the lungs daily       acetaminophen (TYLENOL) 325 MG tablet Take 325-650 mg by mouth every 6 hours as needed (Patient not taking: Reported on 2021)       No facility-administered medications prior to visit.     Allergies/Reactions: Fentanyl and related    No orders of the defined types were placed in this encounter.    Social History     Tobacco Use     Smoking status: Former Smoker     Types: Cigarettes     Quit date: 2021     Years since quittin.7     Smokeless tobacco: Never Used   Substance Use Topics     Alcohol use: Yes     Comment: 8 drinks a week/ gin     Drug use: Not Currently     Types: Marijuana     Family History   Problem Relation Age of Onset     Cerebrovascular Disease Father    No cancers in first or second degree relatives.    ROS (score of 0 indicates that symptom is at baseline for most sections below)   See Flowsheet for additional comments as indicated.  No Pain (0)  Nutrition Alteration  Diet Type: Patient's Preference  Anorexia NCI: 0 - None  Skin  Skin Reaction: 0 - No changes     ENT and Mouth Exam  Mucositis - Current: 0 - None   Mucositis - Internal Severity: 0 - None   Esophagitis: 0 - None     Gastrointestinal  Diarrhea: 2 - Three to five soft or liquid bowel movements per day  Weight loss:  (0 no wt loss)  Genitourinary  Urinary Status: 0 - Normal  Psychosocial  Mood - Anxiety: 0 - Normal  Mood - Depression: 0 - Normal       Physical Examination   Vitals: Ht 1.538 m (5' 0.55\")   Wt 53.5 kg (118 lb)   BMI 22.63 kg/m    Wt Readings from Last 3 Encounters:   10/13/21 53.5 kg (118 lb)   10/06/21 52.1 kg (114 lb 12.8 oz)   21 48.8 kg (107 lb 8 oz)     Evaluation (during the week, " no additional physician evaluations at linac with the staff)):  KPS: 70 (cares for self but unable to carry on normal activity or do active work).  General: Appears clinically stable. Appears in fair general health. Appears thin but not gaunt or wasted. Appears slightly fatigued. Appears stated age. Appears well-developed, well-nourished, and in no acute distress. Does not appear acutely or chronically ill. Appears well groomed.  Head: No alopecia. Normocephalic.  Eyes: Anicteric.  ENT: Good volume. No hoarseness.  Neck: No jugulovenous distension.  Lymphatics: No periumbilical, parotid, periparotid, cervical, or supraclavicular adenopathy.  Chest/Breasts: Left chest wall port site is clean, dry, and intact. No erythema, discharge, or cellulitis. No implanted cardiac device.   Lungs: Easy respirations, no use of accessory muscles. Clear to auscultation.  Cardiovascular: No jugulovenous distension. No carotid pulsations. RRR, S1, S2.  Abdomen: No erythema. Nondistended, nontender. Soft. Bowel sounds positive.  Pelvis: No erythema. Nondistended, nontender. Soft. Bowel sounds positive.  MSK: Shoulder range of motion is . No arm edema or hand edema. No cords or calf tenderness.  Integument: No jaundice or pallor. No cellulitis.  Neurologic: Right-handed. Alert, oriented, fluent. Memory intact. Motor intact. Sensory intact. No ataxia.  Psychologic: Bright, upbeat, relaxed, confident, engaging, and motivated. Pleasant, cooperative, insightful, and concrete.    Physician Radiation Therapy Impression   Routine tolerance to radiation therapy.    Physician Radiation Therapy Plan   No new interventions. Boost/cone down pending. Have reviewed graphical 3D plan with attention to dose to the rectum, small bowel, large bowel, bladder, pelvic bones, etc. Have reviewed the large volume of stool and gas in the treatment planning CT, daily cone beam CT, and associated elevated risk of pelvic toxicity from radiation therapy (e.g., nausea,  vomiting, cramping, diarrhea, etc) as well as complications after surgery. Reviewed anticipated time course, nature, and potential interventions for managing toxicity during and after radiation therapy. Patient aware of onsite and telemedicine coverage of our clinic. She wished to proceed with treatment. All questions answered to the satisfaction of the patient.    Physician Radiation Therapy Weekly Review   Reviewed available labs and diagnostic studies. Interpreted as adequate to proceed with radiation therapy. Discussed management with Therapy, Treatment Planning, and Nursing. Reviewed weekly routine QA, linac imaging, and clinical set up are adequate to proceed with radiation therapy as prescribed.      Suzi Higuera DNP, APRN, FNP-C   Familia Crews MD, PhD  Department of Radiation Oncology  Tel: [phone]  Fax: [phone]

## 2023-12-12 NOTE — NURSING NOTE
"Oncology Rooming Note    September 14, 2021 10:44 AM   Yvette Singletary is a 70 year old female who presents for:    Chief Complaint   Patient presents with     Colorectal Cancer     Rectal cancer     Initial Vitals: Ht 1.664 m (5' 5.5\")   Wt 48.8 kg (107 lb 8 oz)   BMI 17.62 kg/m   Estimated body mass index is 17.62 kg/m  as calculated from the following:    Height as of this encounter: 1.664 m (5' 5.5\").    Weight as of this encounter: 48.8 kg (107 lb 8 oz). Body surface area is 1.5 meters squared.  No Pain (0) Comment: Data Unavailable   No LMP recorded.  Allergies reviewed: Yes  Medications reviewed: Yes    Medications: Medication refills not needed today.  Pharmacy name entered into [facility]: [facility] - [place], [place] - [address] [place] [address]    Clinical concerns: Appetite is poor, stools are more formed, decreased to 2 to 3 Dr. Crews was notified.      Ayah Dobson RN            " Pcp office is calling. Leslie IRENE from PCP office, wants the last office notes to be fax over and clearance form to be fax over at [phone]. Attention to leslie